# Patient Record
Sex: FEMALE | Race: WHITE | URBAN - METROPOLITAN AREA
[De-identification: names, ages, dates, MRNs, and addresses within clinical notes are randomized per-mention and may not be internally consistent; named-entity substitution may affect disease eponyms.]

---

## 2017-06-27 ENCOUNTER — OFFICE VISIT (OUTPATIENT)
Dept: ENDOCRINOLOGY | Age: 82
End: 2017-06-27

## 2017-06-27 ENCOUNTER — HOSPITAL ENCOUNTER (OUTPATIENT)
Dept: LAB | Age: 82
Discharge: HOME | End: 2017-06-27
Payer: MEDICARE

## 2017-06-27 VITALS
TEMPERATURE: 98.1 F | BODY MASS INDEX: 26.61 KG/M2 | OXYGEN SATURATION: 98 % | HEART RATE: 83 BPM | HEIGHT: 62 IN | WEIGHT: 144.6 LBS | SYSTOLIC BLOOD PRESSURE: 160 MMHG | RESPIRATION RATE: 16 BRPM | DIASTOLIC BLOOD PRESSURE: 78 MMHG

## 2017-06-27 DIAGNOSIS — E11.65 TYPE 2 DIABETES MELLITUS WITH HYPERGLYCEMIA, WITHOUT LONG-TERM CURRENT USE OF INSULIN (HCC): ICD-10-CM

## 2017-06-27 DIAGNOSIS — E04.9 NODULAR GOITER: Primary | ICD-10-CM

## 2017-06-27 PROCEDURE — 84443 ASSAY THYROID STIM HORMONE: CPT

## 2017-06-27 PROCEDURE — 84439 ASSAY OF FREE THYROXINE: CPT

## 2017-06-27 PROCEDURE — 36415 COLL VENOUS BLD VENIPUNCTURE: CPT

## 2017-06-27 RX ORDER — GLIMEPIRIDE 1 MG/1
1 TABLET ORAL
COMMUNITY
Start: 2017-05-12

## 2017-06-27 RX ORDER — ESCITALOPRAM OXALATE 5 MG/1
5 TABLET ORAL DAILY
Refills: 0 | COMMUNITY
Start: 2017-06-13

## 2017-06-27 RX ORDER — DIPHENHYDRAMINE HCL 25 MG
25 CAPSULE ORAL
COMMUNITY

## 2017-06-27 RX ORDER — LISINOPRIL 20 MG/1
20 TABLET ORAL DAILY
COMMUNITY
Start: 2017-05-12

## 2017-06-27 RX ORDER — BISMUTH SUBSALICYLATE 262 MG
1 TABLET,CHEWABLE ORAL DAILY
COMMUNITY

## 2017-06-27 RX ORDER — FENOFIBRATE 145 MG/1
145 TABLET ORAL DAILY
COMMUNITY
Start: 2017-06-14

## 2017-06-27 RX ORDER — DILTIAZEM HYDROCHLORIDE 180 MG/1
180 CAPSULE, EXTENDED RELEASE ORAL DAILY
Refills: 0 | COMMUNITY
Start: 2017-06-12

## 2017-06-27 RX ORDER — FUROSEMIDE 20 MG/1
20 TABLET ORAL DAILY
COMMUNITY
Start: 2017-05-30

## 2017-06-27 RX ORDER — APIXABAN 2.5 MG/1
TABLET, FILM COATED ORAL
Refills: 0 | COMMUNITY
Start: 2017-06-12

## 2017-06-27 RX ORDER — CLONIDINE HYDROCHLORIDE 0.1 MG/1
0.1 TABLET ORAL DAILY
COMMUNITY
Start: 2017-05-23

## 2017-06-27 RX ORDER — MELATONIN
1000 DAILY
COMMUNITY

## 2017-06-27 RX ORDER — GLUCOSAM/CHONDRO/HERB 149/HYAL 750-100 MG
1000 TABLET ORAL 2 TIMES DAILY
COMMUNITY

## 2017-06-27 RX ORDER — FLUTICASONE PROPIONATE 50 MCG
SPRAY, SUSPENSION (ML) NASAL
Refills: 0 | COMMUNITY
Start: 2017-03-22

## 2017-06-27 NOTE — MR AVS SNAPSHOT
Visit Information Date & Time Provider Department Dept. Phone Encounter #  
 6/27/2017  1:15 PM Long Oliva MD [facility] [phone] [id number] Upcoming Health Maintenance Date Due DTaP/Tdap/Td series (1 - Tdap) 11/30/1955 ZOSTER VACCINE AGE 60> 11/30/1994 GLAUCOMA SCREENING Q2Y 11/30/1999 OSTEOPOROSIS SCREENING (DEXA) 11/30/1999 Pneumococcal 65+ Low/Medium Risk (1 of 2 - PCV13) 11/30/1999 MEDICARE YEARLY EXAM 11/30/1999 INFLUENZA AGE 9 TO ADULT 8/1/2017 Allergies as of 6/27/2017  Never Reviewed Severity Noted Reaction Type Reactions Penicillins  06/27/2017    Swelling Tramadol  06/27/2017    Nausea and Vomiting Current Immunizations  Never Reviewed No immunizations on file. Not reviewed this visit You Were Diagnosed With   
  
 Codes Comments Nodular goiter    -  Primary ICD-10-CM: E04.9 ICD-9-CM: 541. 9 Vitals BP Pulse Temp Resp Height(growth percentile) Weight(growth percentile) 160/78 (BP 1 Location: Right arm, BP Patient Position: Sitting) 83 98.1 °F (36.7 °C) (Oral) 16 5' 2\" (1.575 m) 144 lb 9.6 oz (65.6 kg) SpO2 BMI Smoking Status 98% 26.45 kg/m2 Never Smoker BMI and BSA Data Body Mass Index Body Surface Area  
 26.45 kg/m 2 1.69 m 2 Your Updated Medication List  
  
Notice  As of 6/27/2017  2:03 PM  
 You have not been prescribed any medications. Patient Instructions Thyroid Nodules: Care Instructions Your Care Instructions Thyroid nodules are growths or lumps in the thyroid gland. Your thyroid is in the front of your neck. It controls how your body uses energy. You may have tests to see if the nodule is caused by cancer. Most nodules aren't cancer and don't cause problems. Many don't even need treatment. If you do have cancer, it can usually be cured.  Treatment will probably include surgery. You may also get radioactive iodine treatment. If your thyroid can't make thyroid hormone after treatment, you can take a pill every day to replace the hormone. Follow-up care is a key part of your treatment and safety. Be sure to make and go to all appointments, and call your doctor if you are having problems. It's also a good idea to know your test results and keep a list of the medicines you take. How can you care for yourself at home? · Be safe with medicines. If you take thyroid hormone medicine: ¨ Take it exactly as prescribed. Call your doctor if you think you are having a problem with your medicine. If you take the right amount and don't skip doses, you probably won't have side effects. ¨ Do not take it with calcium, vitamins, or iron. ¨ Try not to miss a dose. ¨ Do not take extra doses. This will not help you get better any faster. It may also cause side effects. ¨ Tell your doctor about any medicines you take. This includes over-the-counter medicines. ¨ Wear a medical alert bracelet or necklace that says you take thyroid hormones. You can buy these at most drugsHexagram [employer]. When should you call for help? Call 911 anytime you think you may need emergency care. For example, call if: 
· You lose consciousness. Call your doctor now or seek immediate medical care if: 
· You have shortness of breath. Watch closely for changes in your health, and be sure to contact your doctor if: 
· You have pain in your neck, jaw, or ear. · You have problems swallowing. · You feel weak and tired. · You have nervousness, a fast heartbeat, hand tremors, problems sleeping, increased sweating, and weight loss. · You do not feel better even though you are taking your medicine. Where can you learn more? Go to http://juliet-cris.info/. Enter A505 in the search box to learn more about \"Thyroid Nodules: Care Instructions. \" Current as of: January 31, 2017 Content Version: 11.3 © 1401-7174 SocialChorus. Care instructions adapted under license by FaceBuzz (which disclaims liability or warranty for this information). If you have questions about a medical condition or this instruction, always ask your healthcare professional. [address] any warranty or liability for your use of this information. Fine-Needle Thyroid Biopsy: What to Expect at [facility] Your Recovery During your biopsy, your doctor placed a thin needle through your skin and into your thyroid gland to take a sample of tissue. This may have been done to find what is causing a lump or growth in your thyroid. You may find it uncomfortable to lie still with your head tipped backward. The biopsy site may be sore and tender for 1 to 2 days. This care sheet gives you a general idea about how long it will take for you to recover. But each person recovers at a different pace. Follow the steps below to feel better as quickly as possible. How can you care for yourself at home? Activity · Rest when you feel tired. Getting enough sleep will help you recover. Diet · You can eat your normal diet. If your stomach is upset, try bland, low-fat foods like plain rice, broiled chicken, toast, and yogurt. Medicines · Your doctor will tell you if and when you can restart your medicines. He or she will also give you instructions about taking any new medicines. · If you take blood thinners, such as warfarin (Coumadin), clopidogrel (Plavix), or aspirin, be sure to talk to your doctor. He or she will tell you if and when to start taking those medicines again. Make sure that you understand exactly what your doctor wants you to do. · Take pain medicines exactly as directed. ¨ If the doctor gave you a prescription medicine for pain, take it as prescribed. ¨ If you are not taking a prescription pain medicine, ask your doctor if you can take an over-the-counter medicine. · If you think your pain medicine is making you sick to your stomach: 
¨ Take your medicine after meals (unless your doctor has told you not to). ¨ Ask your doctor for a different pain medicine. Incision care Keep the biopsy site covered and dry for 48 hours. A small amount of bleeding from the biopsy site can be expected. Ask your doctor how much drainage to expect. Follow-up care is a key part of your treatment and safety. Be sure to make and go to all appointments, and call your doctor if you are having problems. It's also a good idea to know your test results and keep a list of the medicines you take. When should you call for help? Call 911 anytime you think you may need emergency care. For example, call if: 
· You have severe trouble breathing. Call your doctor now or seek immediate medical care if: 
· You have a lot of bleeding through the bandage. · You have a hard time swallowing. · You have new or worsening pain. · You have symptoms of infection, such as: 
¨ Increased pain, swelling, warmth, or redness. ¨ Red streaks leading from the biopsy site. ¨ Pus draining from the biopsy site. ¨ A fever. Watch closely for any changes in your health, and be sure to contact your doctor if: 
· You're not getting better as expected. · You notice a change in your voice. Where can you learn more? Go to http://juliet-cris.info/. Enter H308 in the search box to learn more about \"Fine-Needle Thyroid Biopsy: What to Expect at Home. \" Current as of: July 28, 2016 Content Version: 11.3 © 9147-5108 Healthwise, Incorporated. Care instructions adapted under license by [employer]IEO (which disclaims liability or warranty for this information). If you have questions about a medical condition or this instruction, always ask your healthcare professional. [place] [address] any warranty or liability for your use of this information. Introducing [facility] SERVICES! Karlee Laughlin introduces Nyxoah patient portal. Now you can access parts of your medical record, email your doctor's office, and request medication refills online. 1. In your internet browser, go to [email or web address] 2. Click on the First Time User? Click Here link in the Sign In box. You will see the New Member Sign Up page. 3. Enter your Nyxoah Access Code exactly as it appears below. You will not need to use this code after youve completed the sign-up process. If you do not sign up before the expiration date, you must request a new code. · Nyxoah Access Code: [email or web address] Expires: 9/25/2017  2:03 PM 
 
4. Enter the last four digits of your Social Security Number (xxxx) and Date of Birth (mm/dd/yyyy) as indicated and click Submit. You will be taken to the next sign-up page. 5. Create a Nyxoah ID. This will be your Nyxoah login ID and cannot be changed, so think of one that is secure and easy to remember. 6. Create a Nyxoah password. You can change your password at any time. 7. Enter your Password Reset Question and Answer. This can be used at a later time if you forget your password. 8. Enter your e-mail address. You will receive e-mail notification when new information is available in [address] 9. Click Sign Up. You can now view and download portions of your medical record. 10. Click the Download Summary menu link to download a portable copy of your medical information. If you have questions, please visit the Frequently Asked Questions section of the Nyxoah website. Remember, Nyxoah is NOT to be used for urgent needs. For medical emergencies, dial 911. Now available from your iPhone and Android! Please provide this summary of care documentation to your next provider. Your primary care clinician is listed as [facility]. If you have any questions after today's visit, please call [phone].

## 2017-06-27 NOTE — PATIENT INSTRUCTIONS
Thyroid Nodules: Care Instructions  Your Care Instructions  Thyroid nodules are growths or lumps in the thyroid gland. Your thyroid is in the front of your neck. It controls how your body uses energy. You may have tests to see if the nodule is caused by cancer. Most nodules aren't cancer and don't cause problems. Many don't even need treatment. If you do have cancer, it can usually be cured. Treatment will probably include surgery. You may also get radioactive iodine treatment. If your thyroid can't make thyroid hormone after treatment, you can take a pill every day to replace the hormone. Follow-up care is a key part of your treatment and safety. Be sure to make and go to all appointments, and call your doctor if you are having problems. It's also a good idea to know your test results and keep a list of the medicines you take. How can you care for yourself at home? · Be safe with medicines. If you take thyroid hormone medicine:  ¨ Take it exactly as prescribed. Call your doctor if you think you are having a problem with your medicine. If you take the right amount and don't skip doses, you probably won't have side effects. ¨ Do not take it with calcium, vitamins, or iron. ¨ Try not to miss a dose. ¨ Do not take extra doses. This will not help you get better any faster. It may also cause side effects. ¨ Tell your doctor about any medicines you take. This includes over-the-counter medicines. ¨ Wear a medical alert bracelet or necklace that says you take thyroid hormones. You can buy these at most drugstores. When should you call for help? Call 911 anytime you think you may need emergency care. For example, call if:  · You lose consciousness. Call your doctor now or seek immediate medical care if:  · You have shortness of breath. Watch closely for changes in your health, and be sure to contact your doctor if:  · You have pain in your neck, jaw, or ear. · You have problems swallowing.   · You feel weak and tired. · You have nervousness, a fast heartbeat, hand tremors, problems sleeping, increased sweating, and weight loss. · You do not feel better even though you are taking your medicine. Where can you learn more? Go to http://juliet-cris.info/. Enter L162 in the search box to learn more about \"Thyroid Nodules: Care Instructions. \"  Current as of: January 31, 2017  Content Version: 11.3  © 0089-2281 Hubskip. Care instructions adapted under license by Tempronics (which disclaims liability or warranty for this information). If you have questions about a medical condition or this instruction, always ask your healthcare professional. [place] [address] any warranty or liability for your use of this information. Fine-Needle Thyroid Biopsy: What to Expect at [address] [place] [address]    During your biopsy, your doctor placed a thin needle through your skin and into your thyroid gland to take a sample of tissue. This may have been done to find what is causing a lump or growth in your thyroid. You may find it uncomfortable to lie still with your head tipped backward. The biopsy site may be sore and tender for 1 to 2 days. This care sheet gives you a general idea about how long it will take for you to recover. But each person recovers at a different pace. Follow the steps below to feel better as quickly as possible. How can you care for yourself at home? Activity  · Rest when you feel tired. Getting enough sleep will help you recover. Diet  · You can eat your normal diet. If your stomach is upset, try bland, low-fat foods like plain rice, broiled chicken, toast, and yogurt. Medicines  · Your doctor will tell you if and when you can restart your medicines. He or she will also give you instructions about taking any new medicines.   · If you take blood thinners, such as warfarin (Coumadin), clopidogrel (Plavix), or aspirin, be sure to talk to your doctor. He or she will tell you if and when to start taking those medicines again. Make sure that you understand exactly what your doctor wants you to do. · Take pain medicines exactly as directed. ¨ If the doctor gave you a prescription medicine for pain, take it as prescribed. ¨ If you are not taking a prescription pain medicine, ask your doctor if you can take an over-the-counter medicine. · If you think your pain medicine is making you sick to your stomach:  ¨ Take your medicine after meals (unless your doctor has told you not to). ¨ Ask your doctor for a different pain medicine. Incision care  Keep the biopsy site covered and dry for 48 hours. A small amount of bleeding from the biopsy site can be expected. Ask your doctor how much drainage to expect. Follow-up care is a key part of your treatment and safety. Be sure to make and go to all appointments, and call your doctor if you are having problems. It's also a good idea to know your test results and keep a list of the medicines you take. When should you call for help? Call 911 anytime you think you may need emergency care. For example, call if:  · You have severe trouble breathing. Call your doctor now or seek immediate medical care if:  · You have a lot of bleeding through the bandage. · You have a hard time swallowing. · You have new or worsening pain. · You have symptoms of infection, such as:  ¨ Increased pain, swelling, warmth, or redness. ¨ Red streaks leading from the biopsy site. ¨ Pus draining from the biopsy site. ¨ A fever. Watch closely for any changes in your health, and be sure to contact your doctor if:  · You're not getting better as expected. · You notice a change in your voice. Where can you learn more? Go to http://juliet-cirs.info/. Enter M566 in the search box to learn more about \"Fine-Needle Thyroid Biopsy: What to Expect at Home. \"  Current as of: July 28, 2016  Content Version: 11.3  © 7522-9490 HealthHamilton, Incorporated. Care instructions adapted under license by Flourish [employer] (which disclaims liability or warranty for this information). If you have questions about a medical condition or this instruction, always ask your healthcare professional. [address] any warranty or liability for your use of this information.

## 2017-06-27 NOTE — PROGRESS NOTES
[facility]               Supriya Connor MD        [address] 78 [phone] Fax [phone]          Patient Information  Date:6/27/2017  Name : Kell Sheehan 80 y.o.     YOB: 1934         Referred by:  Dr Gigi Espinosa        Chief Complaint   Patient presents with   [facility] New Patient     referred by Dr. Hebert Toledo for Thyroid       History of present illness    Kell Sheehan is a 80 y.o. female  here for evaluation of thyroid nodule. He was referred by Dr. Gigi Espinosa for. Evaluation and management of thyroid nodules found on PET scan. She has history of metastatic melanoma, status post radiation therapy   No prior history of thyroid disorders,  Father had goiter    No dysphagia,dysphonia or dyspnea. Denies nervousness,shakiness,palpitations  No constipation or cold intolerance/heat intolerance   FH of thyroid disease. No FH of thyroid cancer     She has type 2 diabetes mellitus Which is controlled with oral medications,no hypoglycemia    Past Medical History:   Diagnosis Date    Atrial fibrillation (HCC)     [facility]     Heart disease     Hyperlipidemia     Hypertension     Incontinence     [facility] ([facility]     [facility]        Current Outpatient Prescriptions   Medication Sig    ELIQUIS 2.5 mg tablet take 1 tablet by mouth twice a day    escitalopram oxalate (LEXAPRO) 5 mg tablet Take 5 mg by mouth daily.  DILT- mg XR capsule Take 120 mg by mouth daily.  cloNIDine HCl (CATAPRES) 0.1 mg tablet Take 0.1 mg by mouth two (2) times a day.  TRICOR 145 mg tablet Take 145 mg by mouth daily.  fluticasone (FLONASE) 50 mcg/actuation nasal spray instill 1 spray INTO EACH NOSTRIL ONCE DAILY PRN    furosemide (LASIX) 20 mg tablet Take 20 mg by mouth daily.  lisinopril (PRINIVIL, ZESTRIL) 20 mg tablet Take 20 mg by mouth daily.  glimepiride (AMARYL) 1 mg tablet Take 1 mg by mouth every morning.     diphenhydrAMINE (BENADRYL) 25 mg capsule Take 25 mg by mouth every six (6) hours as needed.  Omega-3-DHA-EPA-Fish Oil 1,000 mg (120 mg-180 mg) cap Take 1,000 mg by mouth two (2) times a day.  multivitamin (ONE A DAY) tablet Take 1 Tab by mouth daily.  cholecalciferol (VITAMIN D3) 1,000 unit tablet Take 1,000 Units by mouth daily. No current facility-administered medications for this visit. Review of Systems:  - Constitutional Symptoms: no fevers, chills, weight loss  - Eyes: no blurry vision or double vision  - Cardiovascular: no chest pain or palpitations  - Respiratory: no cough or shortness of breath  - Gastrointestinal: no  abdominal pain  - Musculoskeletal: + joint pains + weakness  - Integumentary: no rashes  - Neurological: no numbness, tingling, or headaches  - Psychiatric: no depression or anxiety  - Endocrine: no heat or cold intolerance, no polyuria or polydipsia    Physical Examination:  Blood pressure 160/78, pulse 83, temperature 98.1 °F (36.7 °C), temperature source Oral, resp. rate 16, height 5' 2\" (1.575 m), weight 144 lb 9.6 oz (65.6 kg), SpO2 98 %. Body mass index is 26.45 kg/(m^2). - General: pleasant, no distress, good eye contact  - HEENT: no exopthalmos, no periorbital edema, no scleral/conjunctival injection, EOMI, no lid lag or stare  - Neck: supple, no thyromegaly, nodules,   - Cardiovascular: regular,  normal S1 and S2, no murmurs  - Respiratory: clear to auscultation bilaterally  - Gastrointestinal: soft, nontender, nondistended, BS +  - Musculoskeletal: no proximal muscle weakness in upper or lower extremities  - Integumentary: no tremors, no edema  - Neurological: alert and oriented   - Psychiatric: normal mood and affect  - Skin - normal turgor    Data Reviewed:         [] Reviewed labs    Assessment/Plan:     Multinodular goiter/    She is euthyroid clinically and has no compressive symptoms.   We discussed the natural history of thyroid nodules,most of the nodules are benign and a small percentage ( 5%) can be malignant . We will obtain TSH and proceed with ultrasound. Type 2 diabetes mellitus    Metastatic melanoma - in remission      Hypertension: Continue with present medications        Follow-up Disposition: Not on File    Thank you for allowing me to participate in the care of this patient.     Aby Shoemaker MD

## 2017-06-27 NOTE — PROGRESS NOTES
Johnie Poole is a 80 y.o. female here for   Chief Complaint   Patient presents with    New Patient     referred by Dr. Ok Hurtado for Thyroid       1. Have you been to the ER, urgent care clinic since your last visit? Hospitalized since your last visit? - [facility] mid May for A FIB    2. Have you seen or consulted any other health care providers outside of the [address] [place] [address] since your last visit?   Include any pap smears or colon screening.- Dr. Abiodun Kebede PCP    Wt Readings from Last 3 Encounters:   No data found for Wt     Temp Readings from Last 3 Encounters:   No data found for Temp     BP Readings from Last 3 Encounters:   No data found for BP     Pulse Readings from Last 3 Encounters:   No data found for Pulse

## 2017-06-28 LAB
T4 FREE SERPL-MCNC: 1.27 NG/DL (ref 0.82–1.77)
TSH SERPL DL<=0.005 MIU/L-ACNC: 3.17 UIU/ML (ref 0.45–4.5)

## 2017-08-01 ENCOUNTER — OFFICE VISIT (OUTPATIENT)
Dept: ENDOCRINOLOGY | Age: 82
End: 2017-08-01

## 2017-08-01 VITALS
HEIGHT: 62 IN | DIASTOLIC BLOOD PRESSURE: 85 MMHG | OXYGEN SATURATION: 96 % | SYSTOLIC BLOOD PRESSURE: 178 MMHG | HEART RATE: 90 BPM | TEMPERATURE: 98.1 F | BODY MASS INDEX: 27.03 KG/M2 | WEIGHT: 146.9 LBS | RESPIRATION RATE: 17 BRPM

## 2017-08-01 DIAGNOSIS — E11.65 TYPE 2 DIABETES MELLITUS WITH HYPERGLYCEMIA, WITHOUT LONG-TERM CURRENT USE OF INSULIN (HCC): ICD-10-CM

## 2017-08-01 DIAGNOSIS — E04.9 NODULAR GOITER: Primary | ICD-10-CM

## 2017-08-01 NOTE — PATIENT INSTRUCTIONS
Thyroid Nodules: Care Instructions  Your Care Instructions  Thyroid nodules are growths or lumps in the thyroid gland. Your thyroid is in the front of your neck. It controls how your body uses energy. You may have tests to see if the nodule is caused by cancer. Most nodules aren't cancer and don't cause problems. Many don't even need treatment. If you do have cancer, it can usually be cured. Treatment will probably include surgery. You may also get radioactive iodine treatment. If your thyroid can't make thyroid hormone after treatment, you can take a pill every day to replace the hormone. Follow-up care is a key part of your treatment and safety. Be sure to make and go to all appointments, and call your doctor if you are having problems. It's also a good idea to know your test results and keep a list of the medicines you take. How can you care for yourself at home? · Be safe with medicines. If you take thyroid hormone medicine:  ¨ Take it exactly as prescribed. Call your doctor if you think you are having a problem with your medicine. If you take the right amount and don't skip doses, you probably won't have side effects. ¨ Do not take it with calcium, vitamins, or iron. ¨ Try not to miss a dose. ¨ Do not take extra doses. This will not help you get better any faster. It may also cause side effects. ¨ Tell your doctor about any medicines you take. This includes over-the-counter medicines. ¨ Wear a medical alert bracelet or necklace that says you take thyroid hormones. You can buy these at most drugstores. When should you call for help? Call 911 anytime you think you may need emergency care. For example, call if:  · You lose consciousness. Call your doctor now or seek immediate medical care if:  · You have shortness of breath. Watch closely for changes in your health, and be sure to contact your doctor if:  · You have pain in your neck, jaw, or ear. · You have problems swallowing.   · You feel weak and tired. · You have nervousness, a fast heartbeat, hand tremors, problems sleeping, increased sweating, and weight loss. · You do not feel better even though you are taking your medicine. Where can you learn more? Go to http://juliet-cris.info/. Enter T701 in the search box to learn more about \"Thyroid Nodules: Care Instructions. \"  Current as of: January 31, 2017  Content Version: 11.3  © 2928-7142 [employer]. Care instructions adapted under license by [employer] (which disclaims liability or warranty for this information). If you have questions about a medical condition or this instruction, always ask your healthcare professional. [place] [address] any warranty or liability for your use of this information. Fine-Needle Thyroid Biopsy: Before Your Procedure  What is a needle thyroid biopsy? During a thyroid biopsy, your doctor uses a thin needle to remove a small sample of tissue from your thyroid gland. You may be having the biopsy to find what is causing a lump or growth in your thyroid. The biopsy causes very little pain. But your doctor may need to put the needle into your thyroid more than once. This is done to be sure enough fluid and tissue is taken for the test.  The doctor then looks at the tissue sample under a microscope for cancer, infection, or other thyroid problems. The biopsy is done in a hospital, a clinic, or your doctor's office. During the test, you will lie on your back with a pillow under your shoulders. Your head will be tipped backward and your neck extended. This position pushes the thyroid gland forward. This makes it easier to do the biopsy. You may be given medicine to help you relax. Your doctor may use an ultrasound to guide the placement of the needle. It is important to lie very still during the biopsy. Do not cough, talk, or swallow when the needle is in place.   In some cases, thyroid surgery may be needed if a needle biopsy doesn't give a clear result. This would be done at a different time. In this surgery, the doctor takes a tissue sample through a cut (incision) in the skin. Follow-up care is a key part of your treatment and safety. Be sure to make and go to all appointments, and call your doctor if you are having problems. It's also a good idea to know your test results and keep a list of the medicines you take. What happens before the procedure? Procedures can be stressful. This information will help you understand what you can expect. And it will help you safely prepare for your procedure. You do not need to do anything before your biopsy. You will be awake during the biopsy. Preparing for the procedure  · Understand exactly what procedure is planned, along with the risks, benefits, and other options. · Tell your doctors ALL the medicines, vitamins, supplements, and herbal remedies you take. Some of these can increase the risk of bleeding or interact with anesthesia. · If you take blood thinners, such as warfarin (Coumadin), clopidogrel (Plavix), or aspirin, be sure to talk to your doctor. He or she will tell you if you should stop taking these medicines before your procedure. Make sure that you understand exactly what your doctor wants you to do. · Your doctor will tell you which medicines to take or stop before your procedure. You may need to stop taking certain medicines a week or more before the procedure. So talk to your doctor as soon as you can. · If you have an advance directive, let your doctor know. It may include a living will and a durable power of  for health care. Bring a copy to the hospital. If you don't have one, you may want to prepare one. It lets your doctor and loved ones know your health care wishes. Doctors advise that everyone prepare these papers before any type of surgery or procedure. What happens on the day of the procedure?   · Follow the instructions exactly about when to stop eating and drinking. If you don't, your procedure may be canceled. If your doctor told you to take your medicines on the day of the procedure, take them with only a sip of water. · Take a bath or shower before you come in for your procedure. Do not apply lotions, perfumes, deodorants, or nail polish. · Take off all jewelry and piercings. And take out contact lenses, if you wear them. At the hospital or surgery center  · Bring a picture ID. · The procedure will take about 5 to 10 minutes. Going home  · You may need to stay in the hospital overnight. · Be sure you have someone to drive you home. Anesthesia and pain medicine make it unsafe for you to drive. · You will be given more specific instructions about recovering from your procedure. They will cover things like diet, wound care, follow-up care, driving, and getting back to your normal routine. When should you call your doctor? · You have questions or concerns. · You don't understand how to prepare for your procedure. · You become ill before the procedure (such as fever, flu, or a cold). · You need to reschedule or have changed your mind about having the procedure. Where can you learn more? Go to http://juliet-cris.info/. Enter J510 in the search box to learn more about \"Fine-Needle Thyroid Biopsy: Before Your Procedure. \"  Current as of: July 28, 2016  Content Version: 11.3  © 2472-0423 [employer], [facility]. Care instructions adapted under license by [employer] (which disclaims liability or warranty for this information). If you have questions about a medical condition or this instruction, always ask your healthcare professional. [place] [address] any warranty or liability for your use of this information.

## 2017-08-01 NOTE — PROGRESS NOTES
[facility]               Stew Todd MD        [address] 78 444 81 66 Fax [phone]          Patient Information  Date:8/1/2017  Name : Evelin Duran 80 y.o.     YOB: 1934         Referred by:  Dr Henrik Tena        Chief Complaint   Patient presents with    Thyroid Problem       History of present illness    Evelin Duran is a 80 y.o. female  here for fu  of thyroid nodule. He was referred by Dr. Henrik Tena for. Evaluation and management of thyroid nodules found on PET scan. She has history of metastatic melanoma, status post radiation therapy   No prior history of thyroid disorders,  Father had goiter    No dysphagia,dysphonia or dyspnea. Denies nervousness,shakiness,palpitations  No constipation or cold intolerance/heat intolerance   FH of thyroid disease. No FH of thyroid cancer     She has type 2 diabetes mellitus Which is controlled with oral medications,no hypoglycemia    Past Medical History:   Diagnosis Date    Atrial fibrillation (HCC)     [facility]     Heart disease     Hyperlipidemia     Hypertension     Incontinence     [facility]     Type 2 diabetes mellitus ([facility]        Current Outpatient Prescriptions   Medication Sig    ELIQUIS 2.5 mg tablet take 1 tablet by mouth twice a day    escitalopram oxalate (LEXAPRO) 5 mg tablet Take 5 mg by mouth daily.  DILT- mg XR capsule Take 120 mg by mouth daily.  cloNIDine HCl (CATAPRES) 0.1 mg tablet Take 0.1 mg by mouth two (2) times a day.  TRICOR 145 mg tablet Take 145 mg by mouth daily.  fluticasone (FLONASE) 50 mcg/actuation nasal spray instill 1 spray INTO EACH NOSTRIL ONCE DAILY PRN    furosemide (LASIX) 20 mg tablet Take 20 mg by mouth daily.  lisinopril (PRINIVIL, ZESTRIL) 20 mg tablet Take 20 mg by mouth daily.  glimepiride (AMARYL) 1 mg tablet Take 1 mg by mouth every morning.     diphenhydrAMINE (BENADRYL) 25 mg capsule Take 25 mg by mouth every six (6) hours as needed.  Omega-3-DHA-EPA-Fish Oil 1,000 mg (120 mg-180 mg) cap Take 1,000 mg by mouth two (2) times a day.  multivitamin (ONE A DAY) tablet Take 1 Tab by mouth daily.  cholecalciferol (VITAMIN D3) 1,000 unit tablet Take 1,000 Units by mouth daily. No current facility-administered medications for this visit. Review of Systems:  -   - Respiratory: no cough or shortness of breath  - Gastrointestinal: no  abdominal pain  - Musculoskeletal: + joint pains + weakness  - Integumentary: no rashes  - Neurological: no numbness, tingling, or headaches  - Psychiatric: no depression or anxiety  - Endocrine: no heat or cold intolerance, no polyuria or polydipsia    Physical Examination:  Blood pressure 178/85, pulse 90, temperature 98.1 °F (36.7 °C), temperature source Oral, resp. rate 17, height 5' 2\" (1.575 m), weight 146 lb 14.4 oz (66.6 kg), SpO2 96 %. Body mass index is 26.87 kg/(m^2). - General: pleasant, no distress, good eye contact  - HEENT: no exopthalmos, no periorbital edema, no scleral/conjunctival injection, EOMI, no lid lag or stare  - Neck: supple,    - Cardiovascular: regular,  normal S1 and S2, no murmurs  - Respiratory: clear to auscultation bilaterally  - Gastrointestinal: soft, nontender, nondistended, BS +  - Musculoskeletal: no tremors, no edema  - Neurological: alert and oriented   - Psychiatric: normal mood and affect  - Skin - normal turgor    Data Reviewed:         [] Reviewed labs    Assessment/Plan:     Multinodular goiter/    She is euthyroid clinically and has no compressive symptoms.   Right thyroid nodule with microcalcifications - given hx of XRT discussed about FNA which she wants to discuss with her family and get back to me     Type 2 diabetes mellitus    Metastatic melanoma - in remission      Hypertension: Continue with present medications        Follow-up Disposition: Not on File    Thank you for allowing me to participate in the care of this patient.     Debra Reeder MD

## 2017-08-01 NOTE — MR AVS SNAPSHOT
Visit Information Date & Time Provider Department Dept. Phone Encounter #  
 8/1/2017 11:30 AM Denver Shi, MD [facility] [phone] [id number] Upcoming Health Maintenance Date Due HEMOGLOBIN A1C Q6M 1934 LIPID PANEL Q1 1934 FOOT EXAM Q1 11/30/1944 MICROALBUMIN Q1 11/30/1944 EYE EXAM RETINAL OR DILATED Q1 11/30/1944 DTaP/Tdap/Td series (1 - Tdap) 11/30/1955 ZOSTER VACCINE AGE 60> 9/30/1994 GLAUCOMA SCREENING Q2Y 11/30/1999 OSTEOPOROSIS SCREENING (DEXA) 11/30/1999 Pneumococcal 65+ Low/Medium Risk (1 of 2 - PCV13) 11/30/1999 MEDICARE YEARLY EXAM 11/30/1999 INFLUENZA AGE 9 TO ADULT 8/1/2017 Allergies as of 8/1/2017  Review Complete On: 8/1/2017 By: [place] Severity Noted Reaction Type Reactions Penicillins  06/27/2017    Swelling Tramadol  06/27/2017    Nausea and Vomiting Current Immunizations  Never Reviewed No immunizations on file. Not reviewed this visit You Were Diagnosed With   
  
 Codes Comments Nodular goiter    -  Primary ICD-10-CM: E04.9 ICD-9-CM: 173. 9 Vitals BP Pulse Temp Resp Height(growth percentile) Weight(growth percentile) 178/85 (BP 1 Location: Right arm, BP Patient Position: Sitting) 90 98.1 °F (36.7 °C) (Oral) 17 5' 2\" (1.575 m) 146 lb 14.4 oz (66.6 kg) SpO2 BMI OB Status Smoking Status 96% 26.87 kg/m2 Hysterectomy Never Smoker BMI and BSA Data Body Mass Index Body Surface Area  
 26.87 kg/m 2 1.71 m 2 Preferred Pharmacy Pharmacy Name Phone [phone] [place] [phone] Your Updated Medication List  
  
   
This list is accurate as of: 8/1/17 12:19 PM.  Always use your most recent med list.  
  
  
  
  
 BENADRYL 25 mg capsule Generic drug:  diphenhydrAMINE Take 25 mg by mouth every six (6) hours as needed. cloNIDine HCl 0.1 mg tablet Commonly known as:  CATAPRES Take 0.1 mg by mouth two (2) times a day. DILT- mg XR capsule Generic drug:  dilTIAZem XR Take 120 mg by mouth daily. ELIQUIS 2.5 mg tablet Generic drug:  apixaban  
take 1 tablet by mouth twice a day  
  
 escitalopram oxalate 5 mg tablet Commonly known as:  Rashmi Shukri Take 5 mg by mouth daily. fluticasone 50 mcg/actuation nasal spray Commonly known as:  FLONASE  
instill 1 spray INTO EACH NOSTRIL ONCE DAILY PRN  
  
 furosemide 20 mg tablet Commonly known as:  LASIX Take 20 mg by mouth daily. glimepiride 1 mg tablet Commonly known as:  AMARYL Take 1 mg by mouth every morning. lisinopril 20 mg tablet Commonly known as:  Dominique Mary Take 20 mg by mouth daily. multivitamin tablet Commonly known as:  ONE A DAY Take 1 Tab by mouth daily. Omega-3-DHA-EPA-Fish Oil 1,000 mg (120 mg-180 mg) Cap Take 1,000 mg by mouth two (2) times a day. TRICOR 145 mg tablet Generic drug:  fenofibrate nanocrystallized Take 145 mg by mouth daily. VITAMIN D3 1,000 unit tablet Generic drug:  cholecalciferol Take 1,000 Units by mouth daily. Patient Instructions Thyroid Nodules: Care Instructions Your Care Instructions Thyroid nodules are growths or lumps in the thyroid gland. Your thyroid is in the front of your neck. It controls how your body uses energy. You may have tests to see if the nodule is caused by cancer. Most nodules aren't cancer and don't cause problems. Many don't even need treatment. If you do have cancer, it can usually be cured. Treatment will probably include surgery. You may also get radioactive iodine treatment. If your thyroid can't make thyroid hormone after treatment, you can take a pill every day to replace the hormone. Follow-up care is a key part of your treatment and safety.  Be sure to make and go to all appointments, and call your doctor if you are having problems. It's also a good idea to know your test results and keep a list of the medicines you take. How can you care for yourself at home? · Be safe with medicines. If you take thyroid hormone medicine: ¨ Take it exactly as prescribed. Call your doctor if you think you are having a problem with your medicine. If you take the right amount and don't skip doses, you probably won't have side effects. ¨ Do not take it with calcium, vitamins, or iron. ¨ Try not to miss a dose. ¨ Do not take extra doses. This will not help you get better any faster. It may also cause side effects. ¨ Tell your doctor about any medicines you take. This includes over-the-counter medicines. ¨ Wear a medical alert bracelet or necklace that says you take thyroid hormones. You can buy these at most drugsDancingAnchovyes. When should you call for help? Call 911 anytime you think you may need emergency care. For example, call if: 
· You lose consciousness. Call your doctor now or seek immediate medical care if: 
· You have shortness of breath. Watch closely for changes in your health, and be sure to contact your doctor if: 
· You have pain in your neck, jaw, or ear. · You have problems swallowing. · You feel weak and tired. · You have nervousness, a fast heartbeat, hand tremors, problems sleeping, increased sweating, and weight loss. · You do not feel better even though you are taking your medicine. Where can you learn more? Go to http://juliet-cris.info/. Enter C185 in the search box to learn more about \"Thyroid Nodules: Care Instructions. \" Current as of: January 31, 2017 Content Version: 11.3 © 0714-7230 [email or web address]. Care instructions adapted under license by [employer] (which disclaims liability or warranty for this information).  If you have questions about a medical condition or this instruction, always ask your healthcare professional. [employer] disclaims any warranty or liability for your use of this information. Fine-Needle Thyroid Biopsy: Before Your Procedure What is a needle thyroid biopsy? During a thyroid biopsy, your doctor uses a thin needle to remove a small sample of tissue from your thyroid gland. You may be having the biopsy to find what is causing a lump or growth in your thyroid. The biopsy causes very little pain. But your doctor may need to put the needle into your thyroid more than once. This is done to be sure enough fluid and tissue is taken for the test. 
The doctor then looks at the tissue sample under a microscope for cancer, infection, or other thyroid problems. The biopsy is done in a hospital, a clinic, or your doctor's office. During the test, you will lie on your back with a pillow under your shoulders. Your head will be tipped backward and your neck extended. This position pushes the thyroid gland forward. This makes it easier to do the biopsy. You may be given medicine to help you relax. Your doctor may use an ultrasound to guide the placement of the needle. It is important to lie very still during the biopsy. Do not cough, talk, or swallow when the needle is in place. In some cases, thyroid surgery may be needed if a needle biopsy doesn't give a clear result. This would be done at a different time. In this surgery, the doctor takes a tissue sample through a cut (incision) in the skin. Follow-up care is a key part of your treatment and safety. Be sure to make and go to all appointments, and call your doctor if you are having problems. It's also a good idea to know your test results and keep a list of the medicines you take. What happens before the procedure? Procedures can be stressful. This information will help you understand what you can expect. And it will help you safely prepare for your procedure. You do not need to do anything before your biopsy. You will be awake during the biopsy. Preparing for the procedure · Understand exactly what procedure is planned, along with the risks, benefits, and other options. · Tell your doctors ALL the medicines, vitamins, supplements, and herbal remedies you take. Some of these can increase the risk of bleeding or interact with anesthesia. · If you take blood thinners, such as warfarin (Coumadin), clopidogrel (Plavix), or aspirin, be sure to talk to your doctor. He or she will tell you if you should stop taking these medicines before your procedure. Make sure that you understand exactly what your doctor wants you to do. · Your doctor will tell you which medicines to take or stop before your procedure. You may need to stop taking certain medicines a week or more before the procedure. So talk to your doctor as soon as you can. · If you have an advance directive, let your doctor know. It may include a living will and a durable power of  for health care. Bring a copy to the hospital. If you don't have one, you may want to prepare one. It lets your doctor and loved ones know your health care wishes. Doctors advise that everyone prepare these papers before any type of surgery or procedure. What happens on the day of the procedure? · Follow the instructions exactly about when to stop eating and drinking. If you don't, your procedure may be canceled. If your doctor told you to take your medicines on the day of the procedure, take them with only a sip of water. · Take a bath or shower before you come in for your procedure. Do not apply lotions, perfumes, deodorants, or nail polish. · Take off all jewelry and piercings. And take out contact lenses, if you wear them. At the hospital or surgery center · Bring a picture ID. · The procedure will take about 5 to 10 minutes. Going home · You may need to stay in the hospital overnight. · Be sure you have someone to drive you home. Anesthesia and pain medicine make it unsafe for you to drive. · You will be given more specific instructions about recovering from your procedure. They will cover things like diet, wound care, follow-up care, driving, and getting back to your normal routine. When should you call your doctor? · You have questions or concerns. · You don't understand how to prepare for your procedure. · You become ill before the procedure (such as fever, flu, or a cold). · You need to reschedule or have changed your mind about having the procedure. Where can you learn more? Go to http://juliet-cris.info/. Enter J510 in the search box to learn more about \"Fine-Needle Thyroid Biopsy: Before Your Procedure. \" Current as of: July 28, 2016 Content Version: 11.3 © 8129-5198 [employer]. Care instructions adapted under license by [employer] (which disclaims liability or warranty for this information). If you have questions about a medical condition or this instruction, always ask your healthcare professional. [place] [address] any warranty or liability for your use of this information. Introducing [facility]! [employer] introduces Sellaround patient portal. Now you can access parts of your medical record, email your doctor's office, and request medication refills online. 1. In your internet browser, go to [email or web address] 2. Click on the First Time User? Click Here link in the Sign In box. You will see the New Member Sign Up page. 3. Enter your Sellaround Access Code exactly as it appears below. You will not need to use this code after youve completed the sign-up process. If you do not sign up before the expiration date, you must request a new code. · Sellaround Access Code: [email or web address] Expires: 9/25/2017  2:03 PM 
 
4. Enter the last four digits of your Social Security Number (xxxx) and Date of Birth (mm/dd/yyyy) as indicated and click Submit.  You will be taken to the next sign-up page. 5. Create a Yorn ID. This will be your Yorn login ID and cannot be changed, so think of one that is secure and easy to remember. 6. Create a Yorn password. You can change your password at any time. 7. Enter your Password Reset Question and Answer. This can be used at a later time if you forget your password. 8. Enter your e-mail address. You will receive e-mail notification when new information is available in [address]. 9. Click Sign Up. You can now view and download portions of your medical record. 10. Click the Download Summary menu link to download a portable copy of your medical information. If you have questions, please visit the Frequently Asked Questions section of the Yorn website. Remember, Yorn is NOT to be used for urgent needs. For medical emergencies, dial 911. Now available from your iPhone and Android! Please provide this summary of care documentation to your next provider. Your primary care clinician is listed as [facility]. If you have any questions after today's visit, please call [phone].

## 2017-08-01 NOTE — PROGRESS NOTES
Chief Complaint   Patient presents with    Thyroid Problem     1. Have you been to the ER, urgent care clinic since your last visit? Hospitalized since your last visit? No    2. Have you seen or consulted any other health care providers outside of the [address] [place] [address] since your last visit? Include any pap smears or colon screening.  No

## 2017-08-02 ENCOUNTER — TELEPHONE (OUTPATIENT)
Dept: ENDOCRINOLOGY | Age: 82
End: 2017-08-02

## 2017-08-02 NOTE — PROGRESS NOTES
Thyroid Ultrasound Report    Patient Information  Date:8/1/2017  Name : Maurice Bird 80 y.o.     YOB: 1934         Referred by: DEVANTE Perera ,    Indication: Thyroid nodule/Thyroid cancer /Hashomoto's thyroiditis    Thyroid gland is diffusely heterogenous, right lobe measures 3.8 x 2.1 x 1.5 cm  There is a nodule in the right lobe measuring 2 x 1.36 x 1.26 with intranodular vascularity, possible microcalcification's. Isthmus measures 0.3 cm. Left lobe measures 2.7 x 0.9 x 1.6 cm. Impression:   Nodular goiter. Right thyroid nodule measuring 2 cm with internal vascularity. Fine needle aspiration biopsy recommended.                Gosia Grimaldo MD

## 2017-08-25 ENCOUNTER — TELEPHONE (OUTPATIENT)
Dept: ENDOCRINOLOGY | Age: 82
End: 2017-08-25

## 2017-08-30 NOTE — TELEPHONE ENCOUNTER
Pt states she has an appt in September for a thyroid biopsy. She wanted to let physician know she will be checking with physician who prescribed blood thinner for procedure instructions.

## 2017-09-19 ENCOUNTER — OFFICE VISIT (OUTPATIENT)
Dept: ENDOCRINOLOGY | Age: 82
End: 2017-09-19

## 2017-09-19 VITALS
TEMPERATURE: 97 F | WEIGHT: 145.3 LBS | DIASTOLIC BLOOD PRESSURE: 81 MMHG | HEART RATE: 83 BPM | SYSTOLIC BLOOD PRESSURE: 155 MMHG | HEIGHT: 62 IN | RESPIRATION RATE: 16 BRPM | OXYGEN SATURATION: 98 % | BODY MASS INDEX: 26.74 KG/M2

## 2017-09-19 DIAGNOSIS — E11.65 TYPE 2 DIABETES MELLITUS WITH HYPERGLYCEMIA, WITHOUT LONG-TERM CURRENT USE OF INSULIN (HCC): ICD-10-CM

## 2017-09-19 DIAGNOSIS — E04.9 NODULAR GOITER: Primary | ICD-10-CM

## 2017-09-19 NOTE — PROGRESS NOTES
Hue Choudhary is a 80 y.o. female here for   Chief Complaint   Patient presents with    Biopsy     Right Thyroid       1. Have you been to the ER, urgent care clinic since your last visit? Hospitalized since your last visit? -no    2. Have you seen or consulted any other health care providers outside of the [address] [place] [address] since your last visit?   Include any pap smears or colon screening.-no    Wt Readings from Last 3 Encounters:   08/01/17 146 lb 14.4 oz (66.6 kg)   06/27/17 144 lb 9.6 oz (65.6 kg)     Temp Readings from Last 3 Encounters:   08/01/17 98.1 °F (36.7 °C) (Oral)   06/27/17 98.1 °F (36.7 °C) (Oral)     BP Readings from Last 3 Encounters:   08/01/17 178/85   06/27/17 160/78     Pulse Readings from Last 3 Encounters:   08/01/17 90   06/27/17 83

## 2017-09-19 NOTE — PROGRESS NOTES
[facility] DIABETES & ENDOCRINOLOGY  OFFICE PROCEDURE PROGRESS NOTE        Chart reviewed for the following:   Karen Warner MD, have reviewed the History, Physical and updated the Allergic reactions for Dorothey Resendiz     Procedure performed by:  Ally Matthews MD    Assisted by:Flora Serrano LPN/Kendra Baer LPN    TIME OUT performed immediately prior to start of procedure:   Karen Warner MD, have performed the following reviews on Dorothey Resendiz prior to the start of the procedure:            * Patient was identified by name and date of birth   * Agreement on procedure being performed was verified  * Explained procedure and answered questions  * Procedure site verified and marked as necessary  * Patient was positioned for comfort  * Consent was signed and verified    Time 9:30 AM  Right     The risks, benefits and alternatives of ultrasound guided thyroid fine   needle aspiration were discussed with the patient. After all questions were   answered and informed written consent was obtained, patient was prepped  in a supine position. 1% lidocaine was used as local anesthetic. Using sonographic guidance, fine needle aspiration of thyroid  nodule was performed using 25-gauge needles. 4 aspirations were made using 25  G needles  Samples were submitted to cytology. Patient  tolerated procedure well without complications. Advised to keep ice for 30 minutes after going home. After care instructions provided.   Patient was told to expect a call in 2 weeks regarding the results       Pain at the site before procedure 0/10   Pain post procedure  0/10

## 2017-09-19 NOTE — PATIENT INSTRUCTIONS
Call your doctor right away if you have these   · Bleeding at the injection site for more than 10 minutes  · Redness, warmth, swelling, or drainage at the injection site  · Fever of 100.4°F (38.0°C), or higher        Call 911 if you have     Signs of severe allergic reaction (trouble breathing,tongue swelling )

## 2017-09-19 NOTE — MR AVS SNAPSHOT
Visit Information Date & Time Provider Department Dept. Phone Encounter #  
 9/19/2017  9:15 AM Mary Leon MD [facility] [phone] [id number] Follow-up Instructions Return in about 6 months (around 3/19/2018). Upcoming Health Maintenance Date Due HEMOGLOBIN A1C Q6M 1934 LIPID PANEL Q1 1934 FOOT EXAM Q1 11/30/1944 MICROALBUMIN Q1 11/30/1944 EYE EXAM RETINAL OR DILATED Q1 11/30/1944 DTaP/Tdap/Td series (1 - Tdap) 11/30/1955 ZOSTER VACCINE AGE 60> 9/30/1994 GLAUCOMA SCREENING Q2Y 11/30/1999 OSTEOPOROSIS SCREENING (DEXA) 11/30/1999 Pneumococcal 65+ Low/Medium Risk (1 of 2 - PCV13) 11/30/1999 MEDICARE YEARLY EXAM 11/30/1999 INFLUENZA AGE 9 TO ADULT 8/1/2017 Allergies as of 9/19/2017  Review Complete On: 9/19/2017 By: Mary Leon MD  
  
 Severity Noted Reaction Type Reactions Penicillins  06/27/2017    Swelling Tramadol  06/27/2017    Nausea and Vomiting Current Immunizations  Never Reviewed No immunizations on file. Not reviewed this visit You Were Diagnosed With   
  
 Codes Comments Nodular goiter    -  Primary ICD-10-CM: E04.9 ICD-9-CM: 795. 9 Type 2 diabetes mellitus with hyperglycemia, without long-term current use of insulin (HCC)     ICD-10-CM: E11.65 ICD-9-CM: 250.00, 790.29 Vitals BP Pulse Temp Resp Height(growth percentile) Weight(growth percentile) 155/81 (BP 1 Location: Left arm, BP Patient Position: Sitting) 83 97 °F (36.1 °C) (Oral) 16 5' 2\" (1.575 m) 145 lb 4.8 oz (65.9 kg) SpO2 BMI OB Status Smoking Status 98% 26.58 kg/m2 Hysterectomy Never Smoker Vitals History BMI and BSA Data Body Mass Index Body Surface Area  
 26.58 kg/m 2 1.7 m 2 [facility]erred Pharmacy [facility] Name Phone [id number] [address] [place] [address] [phone] Your Updated Medication List  
  
   
 This list is accurate as of: 9/19/17  9:48 AM.  Always use your most recent med list.  
  
  
  
  
 BENADRYL 25 mg capsule Generic drug:  diphenhydrAMINE Take 25 mg by mouth every six (6) hours as needed. cloNIDine HCl 0.1 mg tablet Commonly known as:  CATAPRES Take 0.1 mg by mouth two (2) times a day. DILT- mg XR capsule Generic drug:  dilTIAZem XR Take 120 mg by mouth daily. ELIQUIS 2.5 mg tablet Generic drug:  apixaban  
take 1 tablet by mouth twice a day  
  
 escitalopram oxalate 5 mg tablet Commonly known as:  Suzanna Espinosa Take 5 mg by mouth daily. fluticasone 50 mcg/actuation nasal spray Commonly known as:  FLONASE  
instill 1 spray INTO EACH NOSTRIL ONCE DAILY PRN  
  
 furosemide 20 mg tablet Commonly known as:  LASIX Take 20 mg by mouth daily. glimepiride 1 mg tablet Commonly known as:  AMARYL Take 1 mg by mouth every morning. lisinopril 20 mg tablet Commonly known as:  Katgena Nolan Take 20 mg by mouth daily. multivitamin tablet Commonly known as:  ONE A DAY Take 1 Tab by mouth daily. Omega-3-DHA-EPA-Fish Oil 1,000 mg (120 mg-180 mg) Cap Take 1,000 mg by mouth two (2) times a day. TRICOR 145 mg tablet Generic drug:  fenofibrate nanocrystallized Take 145 mg by mouth daily. VITAMIN D3 1,000 unit tablet Generic drug:  cholecalciferol Take 1,000 Units by mouth daily. We Performed the Following FINE NEEDLE ASP;W/IMAGING GUIDANCE T6826871 CPT(R)] SONO GUIDE NEEDLE BIOPSY [12419 CPT(R)] Follow-up Instructions Return in about 6 months (around 3/19/2018). To-Do List   
 09/19/2017 Pathology:  CYTOLOGY NON-GYN Patient Instructions Call your doctor right away if you have these · Bleeding at the injection site for more than 10 minutes · Redness, warmth, swelling, or drainage at the injection site · Fever of 100.4°F (38.0°C), or higher 
  
  
 Call 911 if you have 
  
Signs of severe allergic reaction (trouble breathing,tongue swelling ) Introducing [facility] & HEALTH SERVICES! [facility] introduces [employer] patient portal. Now you can access parts of your medical record, email your doctor's office, and request medication refills online. 1. In your internet browser, go to [email or web address] 2. Click on the First Time User? Click Here link in the Sign In box. You will see the New Member Sign Up page. 3. Enter your [employer] Access Code exactly as it appears below. You will not need to use this code after youve completed the sign-up process. If you do not sign up before the expiration date, you must request a new code. · [employer] Access Code: [email or web address] Expires: 9/25/2017  2:03 PM 
 
4. Enter the last four digits of your Social Security Number (xxxx) and Date of Birth (mm/dd/yyyy) as indicated and click Submit. You will be taken to the next sign-up page. 5. Create a [employer] ID. This will be your Game[employer] login ID and cannot be changed, so think of one that is secure and easy to remember. 6. Create a [employer] password. You can change your password at any time. 7. Enter your Password Reset Question and Answer. This can be used at a later time if you forget your password. 8. Enter your e-mail address. You will receive e-mail notification when new information is available in [address] 9. Click Sign Up. You can now view and download portions of your medical record. 10. Click the Download Summary menu link to download a portable copy of your medical information. If you have questions, please visit the Frequently Asked Questions section of the [employer] website. Remember, Game[employer] is NOT to be used for urgent needs. For medical emergencies, dial 911. Now available from your iPhone and Android! Please provide this summary of care documentation to your next provider. Your primary care clinician is listed as Car Lieu. If you have any questions after today's visit, please call [phone].

## 2017-09-24 NOTE — PROGRESS NOTES
[facility]               Eber Montejo MD        [address] 78 [phone] Fax [phone]          Patient Information  Date:9/24/2017  Name : Codi Dee 80 y.o.     YOB: 1934         Referred by:  Dr Aspen Jaramillo        Chief Complaint   Patient presents with    Biopsy     Right Thyroid       History of present illness    Codi Dee is a 80 y.o. female  here for fu  of thyroid nodule. He was referred by Dr. Aspen Jaramillo for. Evaluation and management of thyroid nodules found on PET scan. She has history of metastatic melanoma, status post radiation therapy   No prior history of thyroid disorders,  Father had goiter    No dysphagia,dysphonia or dyspnea. Nervous about procedure and BP is high   On eliquis    She has type 2 diabetes mellitus Which is controlled with oral medications,no hypoglycemia    Past Medical History:   Diagnosis Date    Atrial fibrillation (HCC)     Cancer (HCC)     Heart disease     Hyperlipidemia     Hypertension     Incontinence     [facility] ([facility] [address]     Type 2 diabetes mellitus ([facility] [address]        Current Outpatient Prescriptions   Medication Sig    ELIQUIS 2.5 mg tablet take 1 tablet by mouth twice a day    escitalopram oxalate (LEXAPRO) 5 mg tablet Take 5 mg by mouth daily.  DILT- mg XR capsule Take 120 mg by mouth daily.  cloNIDine HCl (CATAPRES) 0.1 mg tablet Take 0.1 mg by mouth two (2) times a day.  TRICOR 145 mg tablet Take 145 mg by mouth daily.  fluticasone (FLONASE) 50 mcg/actuation nasal spray instill 1 spray INTO EACH NOSTRIL ONCE DAILY PRN    furosemide (LASIX) 20 mg tablet Take 20 mg by mouth daily.  lisinopril (PRINIVIL, ZESTRIL) 20 mg tablet Take 20 mg by mouth daily.  glimepiride (AMARYL) 1 mg tablet Take 1 mg by mouth every morning.  diphenhydrAMINE (BENADRYL) 25 mg capsule Take 25 mg by mouth every six (6) hours as needed.     Omega-3-DHA-EPA-Fish Oil 1,000 mg (120 mg-180 mg) cap Take 1,000 mg by mouth two (2) times a day.  multivitamin (ONE A DAY) tablet Take 1 Tab by mouth daily.  cholecalciferol (VITAMIN D3) 1,000 unit tablet Take 1,000 Units by mouth daily. No current facility-administered medications for this visit. Review of Systems:  -   - Respiratory: no cough or shortness of breath  - Gastrointestinal: no  abdominal pain  - Musculoskeletal: + joint pains + weakness  - Integumentary: no rashes  - Neurological: no numbness, tingling, or headaches  - Psychiatric: no depression or anxiety  - Endocrine: no heat or cold intolerance, no polyuria or polydipsia    Physical Examination:  Blood pressure 155/81, pulse 83, temperature 97 °F (36.1 °C), temperature source Oral, resp. rate 16, height 5' 2\" (1.575 m), weight 145 lb 4.8 oz (65.9 kg), SpO2 98 %. Body mass index is 26.58 kg/(m^2). - General: pleasant, no distress, good eye contact  - HEENT: no exopthalmos, no periorbital edema, no scleral/conjunctival injection, EOMI, no lid lag or stare  - Neck: supple,    - Cardiovascular: regular,  normal S1 and S2, no murmurs  - Respiratory: clear to auscultation bilaterally  - Gastrointestinal: soft, nontender, nondistended, BS +  - Musculoskeletal: no tremors, no edema  - Neurological: alert and oriented   - Psychiatric: normal mood and affect  - Skin - normal turgor    Data Reviewed:         [] Reviewed labs    Assessment/Plan:     Multinodular goiter/    She is euthyroid clinically and has no compressive symptoms. Right thyroid nodule with microcalcifications - given hx of XRT , FNA was planned   Tolerate FNA well     Type 2 diabetes mellitus    Metastatic melanoma - in remission      Hypertension: Continue with present medications        Follow-up Disposition:  Return in about 6 months (around 3/19/2018). Thank you for allowing me to participate in the care of this patient.     Yanira Tabor MD

## 2017-10-03 ENCOUNTER — TELEPHONE (OUTPATIENT)
Dept: ENDOCRINOLOGY | Age: 82
End: 2017-10-03

## 2017-10-03 NOTE — TELEPHONE ENCOUNTER
Informed pt that Afirma biopsy came back negative for cancer per Dr Abdon Esteves. Pt verbalized understanding.

## 2018-03-20 ENCOUNTER — OFFICE VISIT (OUTPATIENT)
Dept: ENDOCRINOLOGY | Age: 83
End: 2018-03-20

## 2018-03-20 VITALS
WEIGHT: 151 LBS | DIASTOLIC BLOOD PRESSURE: 63 MMHG | HEART RATE: 86 BPM | SYSTOLIC BLOOD PRESSURE: 155 MMHG | RESPIRATION RATE: 16 BRPM | HEIGHT: 62 IN | BODY MASS INDEX: 27.79 KG/M2 | TEMPERATURE: 97.5 F | OXYGEN SATURATION: 97 %

## 2018-03-20 DIAGNOSIS — E04.9 NODULAR GOITER: Primary | ICD-10-CM

## 2018-03-20 DIAGNOSIS — I10 ESSENTIAL HYPERTENSION: ICD-10-CM

## 2018-03-20 LAB
GLUCOSE POC: 149 MG/DL
HBA1C MFR BLD HPLC: 6.6 %

## 2018-03-20 NOTE — MR AVS SNAPSHOT
[address] [facility] [address] 
[phone] Patient: Jose David Cherry MRN: [id number] :1934 Visit Information Date & Time Provider Department Dept. Phone Encounter #  
 3/20/2018  9:45 AM Marylene Monica, MD [facility] Endocr[facility] [phone] [id number] Follow-up Instructions Return in about 1 year (around 3/20/2019). Upcoming Health Maintenance Date Due HEMOGLOBIN A1C Q6M 1934 LIPID PANEL Q1 1934 FOOT EXAM Q1 1944 MICROALBUMIN Q1 1944 EYE EXAM RETINAL OR DILATED Q1 1944 DTaP/Tdap/Td series (1 - Tdap) 1955 ZOSTER VACCINE AGE 60> 1994 GLAUCOMA SCREENING Q2Y 1999 Bone Densitometry (Dexa) Screening 1999 Pneumococcal 65+ Low/Medium Risk (1 of 2 - PCV13) 1999 Influenza Age 5 to Adult 2017 MEDICARE YEARLY EXAM 3/14/2018 Allergies as of 3/20/2018  Review Complete On: 3/20/2018 By: Marylene Monica, MD  
  
 Severity Noted Reaction Type Reactions Penicillins  2017    Swelling Tramadol  2017    Nausea and Vomiting Current Immunizations  Never Reviewed No immunizations on file. Not reviewed this visit You Were Diagnosed With   
  
 Codes Comments Nodular goiter    -  Primary ICD-10-CM: E04.9 ICD-9-CM: 384. 9 Vitals BP Pulse Temp Resp Height(growth percentile) Weight(growth percentile) 155/63 (BP 1 Location: Left arm, BP Patient Position: Sitting) 86 97.5 °F (36.4 °C) (Oral) 16 5' 2\" (1.575 m) 151 lb (68.5 kg) SpO2 BMI OB Status Smoking Status 97% 27.62 kg/m2 Hysterectomy Never Smoker Vitals History BMI and BSA Data Body Mass Index Body Surface Area  
 27.62 kg/m 2 1.73 m 2 Preferred Pharmacy Pharmacy Name Phone [id number] [address] [phone] Your Updated Medication List  
  
   
This list is accurate as of 3/20/18 10:34 AM.  Always use your most recent med list.  
  
  
  
  
 BENADRYL 25 mg capsule Generic drug:  diphenhydrAMINE Take 25 mg by mouth every six (6) hours as needed. cloNIDine HCl 0.1 mg tablet Commonly known as:  CATAPRES Take 0.1 mg by mouth two (2) times a day. DILT- mg XR capsule Generic drug:  dilTIAZem XR Take 120 mg by mouth daily. ELIQUIS 2.5 mg tablet Generic drug:  apixaban  
take 1 tablet by mouth twice a day  
  
 escitalopram oxalate 5 mg tablet Commonly known as:  Jazzy Apo Take 5 mg by mouth daily. fluticasone 50 mcg/actuation nasal spray Commonly known as:  FLONASE  
instill 1 spray INTO EACH NOSTRIL ONCE DAILY PRN  
  
 furosemide 20 mg tablet Commonly known as:  LASIX Take 20 mg by mouth daily. glimepiride 1 mg tablet Commonly known as:  AMARYL Take 1 mg by mouth every morning. lisinopril 20 mg tablet Commonly known as:  Antonia [place] Take 20 mg by mouth daily. multivitamin tablet Commonly known as:  ONE A DAY Take 1 Tab by mouth daily. omega 3-DHA-EPA-fish oil 1,000 mg (120 mg-180 mg) capsule Take 1,000 mg by mouth two (2) times a day. TRICOR 145 mg tablet Generic drug:  fenofibrate nanocrystallized Take 145 mg by mouth daily. VITAMIN D3 1,000 unit tablet Generic drug:  cholecalciferol Take 1,000 Units by mouth daily. We Performed the Following AMB POC GLUCOSE, QUANTITATIVE, BLOOD [88543 CPT(R)] AMB POC HEMOGLOBIN A1C [72391 CPT(R)] Follow-up Instructions Return in about 1 year (around 3/20/2019). Introducing [facility]! [facility] introduces [employer] patient portal. Now you can access parts of your medical record, email your doctor's office, and request medication refills online. 1. In your internet browser, go to [email or web address] 2. Click on the First Time User? Click Here link in the Sign In box. You will see the New Member Sign Up page. 3. Enter your [employer] Access Code exactly as it appears below. You will not need to use this code after youve completed the sign-up process. If you do not sign up before the expiration date, you must request a new code. · [employer] Access Code: [email or web address] Expires: 6/18/2018 10:34 AM 
 
4. Enter the last four digits of your Social Security Number (xxxx) and Date of Birth (mm/dd/yyyy) as indicated and click Submit. You will be taken to the next sign-up page. 5. Create a [employer] ID. This will be your [employer] login ID and cannot be changed, so think of one that is secure and easy to remember. 6. Create a [employer] password. You can change your password at any time. 7. Enter your Password Reset Question and Answer. This can be used at a later time if you forget your password. 8. Enter your e-mail address. You will receive e-mail notification when new information is available in [address] 9. Click Sign Up. You can now view and download portions of your medical record. 10. Click the Download Summary menu link to download a portable copy of your medical information. If you have questions, please visit the Frequently Asked Questions section of the [employer] website. Remember, [employer] is NOT to be used for urgent needs. For medical emergencies, dial 911. Now available from your iPhone and Android! Please provide this summary of care documentation to your next provider. Your primary care clinician is listed as Rajeev Rivera. If you have any questions after today's visit, please call [phone].

## 2018-03-20 NOTE — PROGRESS NOTES
[facility]               Iker Nj MD        [address] 78 [phone] Fax [phone]          Patient Information  Date:3/21/2018  Name : Vera Carlisle 80 y.o.     YOB: 1934         Referred by:  Dr Demetrius Askew        Chief Complaint   Patient presents with    Diabetes    Thyroid Problem       History of present illness    Vera Carlisle is a 80 y.o. female  here for fu  of thyroid nodule. He was referred by Dr. Demetrius Askew for. Evaluation and management of thyroid nodules found on PET scan. She has history of metastatic melanoma, status post radiation therapy   Father had goiter  She is able to eat well,      No dysphagia,dysphonia or dyspnea. She has type 2 diabetes mellitus    Past Medical History:   Diagnosis Date    Atrial fibrillation (HCC)     Cancer (HCC)     Heart disease     Hyperlipidemia     Hypertension     Incontinence     Melanoma ([address]     Type 2 diabetes mellitus ([facility] [address]        Current Outpatient Prescriptions   Medication Sig    ELIQUIS 2.5 mg tablet take 1 tablet by mouth twice a day    escitalopram oxalate (LEXAPRO) 5 mg tablet Take 5 mg by mouth daily.  DILT- mg XR capsule Take 120 mg by mouth daily.  cloNIDine HCl (CATAPRES) 0.1 mg tablet Take 0.1 mg by mouth two (2) times a day.  TRICOR 145 mg tablet Take 145 mg by mouth daily.  fluticasone (FLONASE) 50 mcg/actuation nasal spray instill 1 spray INTO EACH NOSTRIL ONCE DAILY PRN    furosemide (LASIX) 20 mg tablet Take 20 mg by mouth daily.  lisinopril (PRINIVIL, ZESTRIL) 20 mg tablet Take 20 mg by mouth daily.  glimepiride (AMARYL) 1 mg tablet Take 1 mg by mouth every morning.  diphenhydrAMINE (BENADRYL) 25 mg capsule Take 25 mg by mouth every six (6) hours as needed.  Omega-3-DHA-EPA-Fish Oil 1,000 mg (120 mg-180 mg) cap Take 1,000 mg by mouth two (2) times a day.     multivitamin (ONE A DAY) tablet Take 1 Tab by mouth daily.    cholecalciferol (VITAMIN D3) 1,000 unit tablet Take 1,000 Units by mouth daily. No current facility-administered medications for this visit. Review of Systems:  -   - Integumentary: no rashes  - Neurological: no numbness, tingling, or headaches  - Psychiatric: no depression or anxiety  - Endocrine: no heat or cold intolerance, no polyuria or polydipsia    Physical Examination:  Blood pressure 155/63, pulse 86, temperature 97.5 °F (36.4 °C), temperature source Oral, resp. rate 16, height 5' 2\" (1.575 m), weight 151 lb (68.5 kg), SpO2 97 %. Body mass index is 27.62 kg/(m^2). - General: pleasant, no distress, good eye contact  - HEENT: no exopthalmos, no periorbital edema, no scleral/conjunctival injection, EOMI, no lid lag or stare  - Neck: supple,    - Cardiovascular: regular,  normal S1 and S2  - Respiratory: clear to auscultation bilaterally  - Gastrointestinal: soft, nontender, nondistended, BS +  - Musculoskeletal: no tremors, no edema  - Neurological: alert and oriented   - Psychiatric: normal mood and affect  - Skin - normal turgor    Diabetic foot exam: March 2018    Left:     Vibratory sensation absent   Filament test normal sensation with micro filament   Pulse DP: 1+    Deformities: Bunion  Right:    Vibratory sensation absent   Filament test normal sensation with micro filament   Pulse DP: 1+   Deformities: Bunion      Data Reviewed:         [] Reviewed labs    Assessment/Plan:     Multinodular goiter/    She is euthyroid clinically and has no compressive symptoms. Right thyroid nodule with microcalcifications -  hx of XRT   Fine-needle aspiration biopsy in September 2017 was benign,AFIRMA 9/17    Type 2 diabetes mellitus - managed by PCP     Metastatic melanoma - in remission      Hypertension: Continue with present medications        Follow-up Disposition:  Return in about 1 year (around 3/20/2019).     Thank you for allowing me to participate in the care of this patient.     Fe Phan MD

## 2018-03-20 NOTE — PROGRESS NOTES
Julianne Murry is a 80 y.o. female here for   Chief Complaint   Patient presents with    Diabetes    Thyroid Problem       Functional glucose monitor and record keeping system? - yes  Eye exam within last year? - Fall 2017 Dr. Shantanu Gutierrez exam within last year? - due    1. Have you been to the ER, urgent care clinic since your last visit? Hospitalized since your last visit? -no    2. Have you seen or consulted any other health care providers outside of the [address] [place] [address] since your last visit?   Include any pap smears or colon screening.- had DEXA and PCP      No results found for: HBA1C, HGBE8, UGH4IPRE, GUV8FAEP    Wt Readings from Last 3 Encounters:   09/19/17 145 lb 4.8 oz (65.9 kg)   08/01/17 146 lb 14.4 oz (66.6 kg)   06/27/17 144 lb 9.6 oz (65.6 kg)     Temp Readings from Last 3 Encounters:   09/19/17 97 °F (36.1 °C) (Oral)   08/01/17 98.1 °F (36.7 °C) (Oral)   06/27/17 98.1 °F (36.7 °C) (Oral)     BP Readings from Last 3 Encounters:   09/19/17 155/81   08/01/17 178/85   06/27/17 160/78     Pulse Readings from Last 3 Encounters:   09/19/17 83   08/01/17 90   06/27/17 83

## 2018-03-21 PROBLEM — E11.65 TYPE 2 DIABETES MELLITUS WITH HYPERGLYCEMIA, WITHOUT LONG-TERM CURRENT USE OF INSULIN (HCC): Status: RESOLVED | Noted: 2017-06-27 | Resolved: 2018-03-21

## 2018-03-21 PROBLEM — I10 ESSENTIAL HYPERTENSION: Status: ACTIVE | Noted: 2018-03-21

## 2018-10-11 LAB — CREATININE, EXTERNAL: 1.2

## 2019-03-19 ENCOUNTER — OFFICE VISIT (OUTPATIENT)
Dept: ENDOCRINOLOGY | Age: 84
End: 2019-03-19

## 2019-03-19 ENCOUNTER — HOSPITAL ENCOUNTER (OUTPATIENT)
Dept: LAB | Age: 84
Discharge: HOME | End: 2019-03-19
Payer: MEDICARE

## 2019-03-19 VITALS
WEIGHT: 145 LBS | OXYGEN SATURATION: 97 % | TEMPERATURE: 96.9 F | DIASTOLIC BLOOD PRESSURE: 88 MMHG | HEART RATE: 101 BPM | HEIGHT: 62 IN | RESPIRATION RATE: 18 BRPM | SYSTOLIC BLOOD PRESSURE: 171 MMHG | BODY MASS INDEX: 26.68 KG/M2

## 2019-03-19 DIAGNOSIS — E04.9 NODULAR GOITER: Primary | ICD-10-CM

## 2019-03-19 PROCEDURE — 84439 ASSAY OF FREE THYROXINE: CPT

## 2019-03-19 PROCEDURE — 84443 ASSAY THYROID STIM HORMONE: CPT

## 2019-03-19 PROCEDURE — 36415 COLL VENOUS BLD VENIPUNCTURE: CPT

## 2019-03-19 RX ORDER — TOLTERODINE TARTRATE 2 MG/1
TABLET, EXTENDED RELEASE ORAL
Refills: 0 | COMMUNITY
Start: 2019-03-11

## 2019-03-19 NOTE — PROGRESS NOTES
Thyroid Ultrasound Report Patient Information Date:3/19/2019 Name : Tyrel Kc 80 y.o.    
YOB: 1934 Indication: Thyroid nodule Thyroid gland is diffusely heterogenous, right lobe measures 2.6 x 1.8 x 1.1 cm. Left lobe measures 2.6 x 1.3 x 1.6 cm. Isthmus measures 0.34 cm There is a nodule in the right lobe measuring 2 x 1.5 x 1.3 with intranodular vascularity,  microcalcification's. Impression:  
Nodular goiter. Right thyroid nodule measuring 2 cm microcalcifications, nodule has not changed compared to prior ultrasound.   Fine-needle aspiration biopsy was benign in the past. 
 
 
 
 
  
Ky Madison MD

## 2019-03-19 NOTE — PROGRESS NOTES
All health maintenance and other pertinent information has been reviewed in preparation for today's office visit. Patient presents in the office today for: Chief Complaint Patient presents with  Thyroid Problem 1. Have you been to the ER, urgent care clinic since your last visit? Hospitalized since your last visit? No 
 
2. Have you seen or consulted any other health care providers outside of the [address] [place] [address] since your last visit? Include any pap smears or colon screening. No  
 
 
Wt Readings from Last 3 Encounters:  
03/19/19 145 lb (65.8 kg) 03/20/18 151 lb (68.5 kg) 09/19/17 145 lb 4.8 oz (65.9 kg) Temp Readings from Last 3 Encounters:  
03/19/19 96.9 °F (36.1 °C) (Oral) 03/20/18 97.5 °F (36.4 °C) (Oral) 09/19/17 97 °F (36.1 °C) (Oral) BP Readings from Last 3 Encounters:  
03/19/19 171/88  
03/20/18 155/63  
09/19/17 155/81 Pulse Readings from Last 3 Encounters:  
03/19/19 (!) 101  
03/20/18 86  
09/19/17 83

## 2019-03-19 NOTE — PROGRESS NOTES
[facility] DIABETES AND ENDOCRINOLOGY Antony Gutierres MD 
 
    [address] 78 [phone] Fax [phone] Patient Information Date:3/19/2019 Name : Betty Cuevas 80 y.o.    
YOB: 1934 Referred by:  Dr Marcellus Lofton Chief Complaint Patient presents with  Thyroid Problem History of present illness Betty Cuevas is a 80 y.o. female  here for fu  of thyroid nodule. He was referred by Dr. Marcellus Lofton for. Evaluation and management of thyroid nodules found on PET scan. She has history of metastatic melanoma, status post radiation therapy Father had goiter She has not noted any change in the size of the neck, no dysphagia, dyspnea. No nervousness, no weight loss. No recent thyroid function test. 
 
 
Past Medical History:  
Diagnosis Date  Atrial fibrillation ([facility]  Cancer ([facility]  Heart disease  Hyperlipidemia  Hypertension  Incontinence  Melanoma ([facility]  Type 2 diabetes mellitus ([facility] Current Outpatient Medications Medication Sig  tolterodine (DETROL) 2 mg tablet  ELIQUIS 2.5 mg tablet take 1 tablet by mouth twice a day  DILT- mg XR capsule Take 120 mg by mouth daily.  cloNIDine HCl (CATAPRES) 0.1 mg tablet Take 0.1 mg by mouth two (2) times a day.  furosemide (LASIX) 20 mg tablet Take 20 mg by mouth daily.  lisinopril (PRINIVIL, ZESTRIL) 20 mg tablet Take 20 mg by mouth daily.  glimepiride (AMARYL) 1 mg tablet Take 1 mg by mouth every morning.  diphenhydrAMINE (BENADRYL) 25 mg capsule Take 25 mg by mouth every six (6) hours as needed.  Omega-3-DHA-EPA-Fish Oil 1,000 mg (120 mg-180 mg) cap Take 1,000 mg by mouth two (2) times a day.  multivitamin (ONE A DAY) tablet Take 1 Tab by mouth daily.  cholecalciferol (VITAMIN D3) 1,000 unit tablet Take 1,000 Units by mouth daily.  escitalopram oxalate (LEXAPRO) 5 mg tablet Take 5 mg by mouth daily.  TRICOR 145 mg tablet Take 145 mg by mouth daily.  fluticasone (FLONASE) 50 mcg/actuation nasal spray instill 1 spray INTO EACH NOSTRIL ONCE DAILY PRN No current facility-administered medications for this visit. Review of Systems: 
-  
- Integumentary: no rashes - Neurological: no numbness, tingling, or headaches - Psychiatric: no depression or anxiety - Endocrine: no heat or cold intolerance, no polyuria or polydipsia Physical Examination: 
Blood pressure 171/88, pulse (!) 101, temperature 96.9 °F (36.1 °C), temperature source Oral, resp. rate 18, height 5' 2\" (1.575 m), weight 145 lb (65.8 kg), SpO2 97 %. Body mass index is 26.52 kg/m². - General: pleasant, no distress, good eye contact 
- HEENT: no exopthalmos, no periorbital edema, no scleral/conjunctival injection, EOMI, no lid lag or stare 
- Neck: supple,   
- Cardiovascular: regular,  normal S1 and S2 
- Respiratory: clear to auscultation bilaterally - Gastrointestinal: soft, nontender, nondistended, BS + 
- Musculoskeletal: no tremors, no edema 
- Neurological: alert and oriented - Psychiatric: normal mood and affect 
- Skin - normal turgor Diabetic foot exam: March 2018 Left:   
 Vibratory sensation absent Filament test normal sensation with micro filament Pulse DP: 1+ Deformities: Bunion Right:  
 Vibratory sensation absent Filament test normal sensation with micro filament Pulse DP: 1+ Deformities: Bunion Data Reviewed:  
 
 
 
[] Reviewed labs Assessment/Plan:  
 
Multinodular goiter/ She has no compressive symptoms Right thyroid nodule 2 cm with microcalcifications -  hx of XRT Fine-needle aspiration biopsy in September 2017 was benign,AFIRMA 9/17 Ultrasound thyroid March 2019 right thyroid nodule 2 cm no change Thyroid function test today Type 2 diabetes mellitus - managed by PCP  
 
 Metastatic melanoma - in remission Hypertension: Continue with present medications Return in 1-1/2-year Follow-up Disposition: Not on File Thank you for allowing me to participate in the care of this patient.  
 
Rimma Aponte MD

## 2019-03-20 LAB
T4 FREE SERPL-MCNC: 1.28 NG/DL (ref 0.82–1.77)
TSH SERPL DL<=0.005 MIU/L-ACNC: 3.57 UIU/ML (ref 0.45–4.5)

## 2020-09-15 ENCOUNTER — OFFICE VISIT (OUTPATIENT)
Dept: ENDOCRINOLOGY | Age: 85
End: 2020-09-15
Payer: MEDICARE

## 2020-09-15 VITALS
BODY MASS INDEX: 25.4 KG/M2 | SYSTOLIC BLOOD PRESSURE: 182 MMHG | RESPIRATION RATE: 18 BRPM | HEART RATE: 81 BPM | DIASTOLIC BLOOD PRESSURE: 87 MMHG | HEIGHT: 62 IN | TEMPERATURE: 97.5 F | WEIGHT: 138 LBS

## 2020-09-15 DIAGNOSIS — E04.9 NODULAR GOITER: Primary | ICD-10-CM

## 2020-09-15 DIAGNOSIS — I10 ESSENTIAL HYPERTENSION: ICD-10-CM

## 2020-09-15 LAB
T4 FREE SERPL-MCNC: 1.2 NG/DL (ref 0.8–1.5)
TSH SERPL DL<=0.05 MIU/L-ACNC: 2.48 UIU/ML (ref 0.36–3.74)

## 2020-09-15 PROCEDURE — G8419 CALC BMI OUT NRM PARAM NOF/U: HCPCS | Performed by: INTERNAL MEDICINE

## 2020-09-15 PROCEDURE — G8432 DEP SCR NOT DOC, RNG: HCPCS | Performed by: INTERNAL MEDICINE

## 2020-09-15 PROCEDURE — 1101F PT FALLS ASSESS-DOCD LE1/YR: CPT | Performed by: INTERNAL MEDICINE

## 2020-09-15 PROCEDURE — G8536 NO DOC ELDER MAL SCRN: HCPCS | Performed by: INTERNAL MEDICINE

## 2020-09-15 PROCEDURE — 99214 OFFICE O/P EST MOD 30 MIN: CPT | Performed by: INTERNAL MEDICINE

## 2020-09-15 PROCEDURE — 76536 US EXAM OF HEAD AND NECK: CPT | Performed by: INTERNAL MEDICINE

## 2020-09-15 PROCEDURE — G8753 SYS BP > OR = 140: HCPCS | Performed by: INTERNAL MEDICINE

## 2020-09-15 PROCEDURE — G8400 PT W/DXA NO RESULTS DOC: HCPCS | Performed by: INTERNAL MEDICINE

## 2020-09-15 PROCEDURE — G8754 DIAS BP LESS 90: HCPCS | Performed by: INTERNAL MEDICINE

## 2020-09-15 PROCEDURE — G8427 DOCREV CUR MEDS BY ELIG CLIN: HCPCS | Performed by: INTERNAL MEDICINE

## 2020-09-15 PROCEDURE — 1090F PRES/ABSN URINE INCON ASSESS: CPT | Performed by: INTERNAL MEDICINE

## 2020-09-15 RX ORDER — BRINZOLAMIDE 10 MG/ML
1 SUSPENSION/ DROPS OPHTHALMIC 2 TIMES DAILY
COMMUNITY

## 2020-09-15 RX ORDER — MICONAZOLE NITRATE 2 %
1 CREAM WITH APPLICATOR VAGINAL 2 TIMES DAILY
COMMUNITY

## 2020-09-15 RX ORDER — LIDOCAINE 50 MG/G
1 PATCH TOPICAL AS NEEDED
COMMUNITY

## 2020-09-15 RX ORDER — DIAPER,BRIEF,INFANT-TODD,DISP
1 EACH MISCELLANEOUS DAILY
COMMUNITY

## 2020-09-15 RX ORDER — ACETAMINOPHEN 325 MG/1
325 TABLET ORAL
COMMUNITY

## 2020-09-15 NOTE — LETTER
9/19/20 Patient: Csaper Green YOB: 1934 Date of Visit: 9/15/2020 Sherry Castro MD 
[address] [place] [address] VIA Facsimile: [phone] Dear Sherry Castro MD, Thank you for referring Ms. Casper Green to [address] [place] [address] for evaluation. My notes for this consultation are attached. If you have questions, please do not hesitate to call me. I look forward to following your patient along with you. Sincerely, Marifer Carlson MD

## 2020-09-15 NOTE — PROGRESS NOTES
Lexus Vidal MD           Patient Information  Date:9/15/2020  Name : Kimberly Chau 80 y.o.     YOB: 1934         Referred by:  Dr Kee Klein        Chief Complaint   Patient presents with    Thyroid Problem    Ultrasound       History of present illness    Kimberly Chau is a 80 y.o. female  here for fu  of thyroid nodule. He was referred by Dr. Kee Klein for. Evaluation and management of thyroid nodules found on PET scan. She has history of metastatic melanoma, status post radiation therapy   Father had goiter  She has not noted any change in the size of the neck, no dysphagia, dyspnea. No nervousness, no weight loss. No recent thyroid function test.    Blood pressure is elevated: She is nervous about the ultrasound  No dysphagia, dyspnea  No chest pain or shortness of breath    Past Medical History:   Diagnosis Date    Atrial fibrillation (HCC)     [facility] ([facility]     Heart disease     Hyperlipidemia     Hypertension     Incontinence     [facility]     Type 2 diabetes mellitus (HCC)        Current Outpatient Medications   Medication Sig    tolterodine (DETROL) 2 mg tablet     ELIQUIS 2.5 mg tablet take 1 tablet by mouth twice a day    escitalopram oxalate (LEXAPRO) 5 mg tablet Take 5 mg by mouth daily.  DILT- mg XR capsule Take 120 mg by mouth daily.  cloNIDine HCl (CATAPRES) 0.1 mg tablet Take 0.1 mg by mouth two (2) times a day.  TRICOR 145 mg tablet Take 145 mg by mouth daily.  fluticasone (FLONASE) 50 mcg/actuation nasal spray instill 1 spray INTO EACH NOSTRIL ONCE DAILY PRN    furosemide (LASIX) 20 mg tablet Take 20 mg by mouth daily.  lisinopril (PRINIVIL, ZESTRIL) 20 mg tablet Take 20 mg by mouth daily.  glimepiride (AMARYL) 1 mg tablet Take 1 mg by mouth every morning.  diphenhydrAMINE (BENADRYL) 25 mg capsule Take 25 mg by mouth every six (6) hours as needed.     Omega-3-DHA-EPA-Fish Oil 1,000 mg (120 mg-180 mg) cap Take 1,000 mg by mouth two (2) times a day.  multivitamin (ONE A DAY) tablet Take 1 Tab by mouth daily.  cholecalciferol (VITAMIN D3) 1,000 unit tablet Take 1,000 Units by mouth daily. No current facility-administered medications for this visit. Review of Systems:  -   - Integumentary: no rashes  - Neurological: no numbness, tingling, or headaches  - Psychiatric: no depression or anxiety  - Endocrine: no heat or cold intolerance, no polyuria or polydipsia    Physical Examination:  Height 5' 2\" (1.575 m). Body mass index is 26.52 kg/m².   - General: pleasant, no distress, good eye contact  - HEENT: no exopthalmos, no periorbital edema, no scleral/conjunctival injection, EOMI, no lid lag or stare  - Neck: supple,    - Cardiovascular: regular,  normal S1 and S2  - Respiratory: clear to auscultation bilaterally  - Gastrointestinal: soft, nontender, nondistended, BS +  - Musculoskeletal: no tremors, no edema  - Neurological: alert and oriented   - Psychiatric: normal mood and affect  - Skin - normal turgor    Diabetic foot exam: March 2018    Left:     Vibratory sensation absent   Filament test normal sensation with micro filament   Pulse DP: 1+    Deformities: Bunion  Right:    Vibratory sensation absent   Filament test normal sensation with micro filament   Pulse DP: 1+   Deformities: Bunion      Data Reviewed:         [] Reviewed labs    Assessment/Plan:     Multinodular goiter-new isthmus nodule    She has no compressive symptoms  Right thyroid nodule 2 cm with microcalcifications -  hx of XRT   Fine-needle aspiration biopsy in September 2017 was benign,AFIRMA 9/17  Ultrasound thyroid March 2019 right thyroid nodule 2 cm no change  Ultrasound thyroid 2020: Right thyroid nodule 2 x 1.3 x 1 cm, isthmus nodule not clearly defined measuring 1.8 x 0.7 x 0.4 cm  We will monitor      Type 2 diabetes mellitus - managed by PCP     Metastatic melanoma - in remission      Hypertension: Continue with present medications  Elevated      Return in 1-1/2-year        Thank you for allowing me to participate in the care of this patient.     Tmi Dee MD

## 2020-09-15 NOTE — PROGRESS NOTES
Shireen Garrison is a 80 y.o. female here for   Chief Complaint   Patient presents with    Thyroid Problem    Ultrasound       1. Have you been to the ER, urgent care clinic since your last visit? Hospitalized since your last visit? -no    2. Have you seen or consulted any other health care providers outside of the [address] [place] [address] since your last visit? Include any pap smears or colon screening. -PCP and oncology    Order placed for pt per verbal order with read back from Dr. Zeina Altman 09/15/20

## 2020-09-19 NOTE — PROGRESS NOTES
Thyroid Ultrasound Report    Patient Information  Date:9/19/2020  Name : Vern Lloyd 80 y.o.     YOB: 1934           Indication: Thyroid nodule    Thyroid gland is diffusely heterogenous, right lobe measures 2.6 x 1.8 x 1.1 cm. Left lobe measures 2.6 x 1.3 x 1.6 cm. Isthmus measures 0.34 cm  There is a nodule in the right lobe measuring 2 x 1.5 x 1.3 with intranodular vascularity,  microcalcification's. There was another nodule which is new measuring 1.8 x 3.7 x 0.4 cm in the isthmus, borders not clearly defined. No microcalcifications  Impression:   Nodular goiter. Right thyroid nodule measuring 2 cm microcalcifications, nodule has not changed compared to prior ultrasound. Fine-needle aspiration biopsy was benign in the past.  Isthmus nodule new measuring 1.8 cm  Continue to monitor.              Becca Echeverria MD

## 2022-03-19 PROBLEM — E04.9 NODULAR GOITER: Status: ACTIVE | Noted: 2017-06-27

## 2022-03-20 PROBLEM — I10 ESSENTIAL HYPERTENSION: Status: ACTIVE | Noted: 2018-03-21

## 2022-06-28 ENCOUNTER — TELEPHONE (OUTPATIENT)
Dept: ENT CLINIC | Age: 87
End: 2022-06-28

## 2022-06-28 NOTE — TELEPHONE ENCOUNTER
[facility] is referring this pt for an excision of a basal cell carcinoma on the left cheek. Where is the best place to schedule this pt?

## 2022-08-30 ENCOUNTER — OFFICE VISIT (OUTPATIENT)
Dept: ENT CLINIC | Age: 87
End: 2022-08-30
Payer: MEDICARE

## 2022-08-30 VITALS
WEIGHT: 140 LBS | RESPIRATION RATE: 17 BRPM | HEIGHT: 62 IN | HEART RATE: 63 BPM | SYSTOLIC BLOOD PRESSURE: 140 MMHG | OXYGEN SATURATION: 98 % | BODY MASS INDEX: 25.76 KG/M2 | DIASTOLIC BLOOD PRESSURE: 80 MMHG

## 2022-08-30 DIAGNOSIS — H90.3 SENSORINEURAL HEARING LOSS (SNHL) OF BOTH EARS: Primary | ICD-10-CM

## 2022-08-30 DIAGNOSIS — R04.0 EPISTAXIS: ICD-10-CM

## 2022-08-30 DIAGNOSIS — C44.319 BASAL CELL CARCINOMA (BCC) OF SKIN OF OTHER PART OF FACE: ICD-10-CM

## 2022-08-30 PROCEDURE — 1123F ACP DISCUSS/DSCN MKR DOCD: CPT | Performed by: OTOLARYNGOLOGY

## 2022-08-30 PROCEDURE — G8417 CALC BMI ABV UP PARAM F/U: HCPCS | Performed by: OTOLARYNGOLOGY

## 2022-08-30 PROCEDURE — G8510 SCR DEP NEG, NO PLAN REQD: HCPCS | Performed by: OTOLARYNGOLOGY

## 2022-08-30 PROCEDURE — 99203 OFFICE O/P NEW LOW 30 MIN: CPT | Performed by: OTOLARYNGOLOGY

## 2022-08-30 PROCEDURE — 1090F PRES/ABSN URINE INCON ASSESS: CPT | Performed by: OTOLARYNGOLOGY

## 2022-08-30 PROCEDURE — 1101F PT FALLS ASSESS-DOCD LE1/YR: CPT | Performed by: OTOLARYNGOLOGY

## 2022-08-30 PROCEDURE — G8536 NO DOC ELDER MAL SCRN: HCPCS | Performed by: OTOLARYNGOLOGY

## 2022-08-30 PROCEDURE — G8427 DOCREV CUR MEDS BY ELIG CLIN: HCPCS | Performed by: OTOLARYNGOLOGY

## 2022-08-30 NOTE — LETTER
8/30/2022    Patient: Ayana Ibarra   YOB: 1934   Date of Visit: 8/30/2022     Maryanne Zavala MD  [address] [facility] [address]  Via Fax: [phone]     Dermatology [place] [address]  [address] [facility]  [phone]  Via Fax: [phone]    Dear Maryanne Zavala MD  Dermatology [place] [address],      Thank you for referring Ms. Concepcion Garcia to [facility] EAR NOSE AND THROAT [facility], THROAT AND ALLERGY CARE for evaluation. My notes for this consultation are attached. If you have questions, please do not hesitate to call me. I look forward to following your patient along with you.       Sincerely,    Yeny Lawson MD

## 2022-08-30 NOTE — Clinical Note
Check with PCP Dr. Lou Fisher or cardiologist Dr. Lesvia Nieves regarding holding her Eliquis for office procedure

## 2022-08-30 NOTE — PROGRESS NOTES
Subjective: Lennie Gowers   80 y.o.   1934     New Patient Visit  08/30/22     Location -left cheek    Quality -BCCA    Severity -mild to moderate    Duration -few months    Timing -ongoing    Context -sent by dermatology for left cheek basal cell carcinoma, surgical consultation. History of melanoma of the left arm    Modifying Features -none    Associated symptoms/signs -hearing loss, history of epistaxis      Review of Systems  Review of Systems   Constitutional:  Negative for chills and fever. HENT:  Positive for hearing loss and nosebleeds. Negative for ear pain and tinnitus. Eyes:  Negative for blurred vision and double vision. Respiratory:  Negative for cough, sputum production and shortness of breath. Cardiovascular:  Negative for chest pain and palpitations. Gastrointestinal:  Negative for heartburn, nausea and vomiting. Musculoskeletal:  Positive for joint pain and neck pain. Skin: Negative. Neurological:  Positive for weakness. Negative for dizziness, speech change and headaches. Endo/Heme/Allergies:  Negative for environmental allergies. Does not bruise/bleed easily. Psychiatric/Behavioral:  Negative for memory loss. The patient does not have insomnia.         Past Medical History:   Diagnosis Date    Atrial fibrillation ([address]     Cancer ([facility]     Heart disease     Hyperlipidemia     Hypertension     Incontinence     Melanoma ([facility]     Type 2 diabetes mellitus ([facility]      Past Surgical History:   Procedure Laterality Date    HX ATRIAL SEPTAL DEFECT REPAIR  1992    HX CATARACT REMOVAL Bilateral 2016    HX CHOLECYSTECTOMY  1993    HX COLONOSCOPY  08/2017    HX CYST REMOVAL  1969    on spine    HX HYSTERECTOMY  1988    HX KNEE ARTHROSCOPY  2011    HX LIPOMA RESECTION  2014    HX OTHER SURGICAL  2013    Melanoma excision    HX OTHER SURGICAL  1966    Diverticulum    HX TONSILLECTOMY  1943      Family History   Problem Relation Age of Onset    Cancer Mother         breast Heart Disease Father      Social History     Tobacco Use    Smoking status: Never    Smokeless tobacco: Never   Substance Use Topics    Alcohol use: No      Prior to Admission medications    Medication Sig Start Date End Date Taking? Authorizing Provider   brinzolamide (Azopt) 1 % ophthalmic suspension Administer 1 Drop to left eye two (2) times a day. Yes Provider, Historical   biotin 10,000 mcg cap Take 1 Cap by mouth daily. Yes Provider, Historical   calcium citrate-vitamin D3 (CITRACAL WITH VITAMIN D MAXIMUM) tablet Take 1 Tab by mouth two (2) times a day. Yes Provider, Historical   mirabegron ER (MYRBETRIQ) 50 mg ER tablet Take 50 mg by mouth daily. Yes Provider, Historical   lidocaine (LIDODERM) 5 % 1 Patch by TransDERmal route as needed. Apply patch to the affected area for 12 hours a day and remove for 12 hours a day. Yes Provider, Historical   acetaminophen (TYLENOL) 325 mg tablet Take 325 mg by mouth every four (4) hours as needed for Pain. Yes Provider, Historical   tolterodine (DETROL) 2 mg tablet  3/11/19  Yes Provider, Historical   ELIQUIS 2.5 mg tablet take 1 tablet by mouth twice a day 6/12/17  Yes Provider, Historical   escitalopram oxalate (LEXAPRO) 5 mg tablet Take 5 mg by mouth daily. 6/13/17  Yes Provider, Historical   DILT- mg capsule Take 180 mg by mouth daily. 6/12/17  Yes Provider, Historical   cloNIDine HCl (CATAPRES) 0.1 mg tablet Take 0.1 mg by mouth daily. 5/23/17  Yes Provider, Historical   TRICOR 145 mg tablet Take 145 mg by mouth daily. 6/14/17  Yes Provider, Historical   fluticasone (FLONASE) 50 mcg/actuation nasal spray instill 1 spray INTO EACH NOSTRIL ONCE DAILY PRN 3/22/17  Yes Provider, Historical   furosemide (LASIX) 20 mg tablet Take 20 mg by mouth daily. 5/30/17  Yes Provider, Historical   lisinopril (PRINIVIL, ZESTRIL) 20 mg tablet Take 20 mg by mouth daily.  5/12/17  Yes Provider, Historical   glimepiride (AMARYL) 1 mg tablet Take 1 mg by mouth every morning. 5/12/17  Yes Provider, Historical   diphenhydrAMINE (BENADRYL) 25 mg capsule Take 25 mg by mouth every six (6) hours as needed. Yes Provider, Historical   Omega-3-DHA-EPA-Fish Oil 1,000 mg (120 mg-180 mg) cap Take 1,000 mg by mouth two (2) times a day. Yes Provider, Historical   multivitamin (ONE A DAY) tablet Take 1 Tab by mouth daily. Yes Provider, Historical   cholecalciferol (VITAMIN D3) (1000 Units /25 mcg) tablet Take 1,000 Units by mouth daily. Yes Provider, Historical        Allergies   Allergen Reactions    Penicillins Swelling    Tramadol Nausea and Vomiting         Objective:     Visit Vitals  BP (!) 140/80 (BP 1 Location: Right upper arm, BP Patient Position: Sitting, BP Cuff Size: Adult)   Pulse 63   Resp 17   Ht 5' 2\" (1.575 m)   Wt 140 lb (63.5 kg)   SpO2 98%   BMI 25.61 kg/m²        Physical Exam  Vitals reviewed. Constitutional:       General: She is awake. She is not in acute distress. Appearance: Normal appearance. She is well-groomed and normal weight. HENT:      Head: Normocephalic and atraumatic. Jaw: There is normal jaw occlusion. No trismus, tenderness or malocclusion. Salivary Glands: Right salivary gland is not diffusely enlarged or tender. Left salivary gland is not diffusely enlarged or tender. Comments: Small crusted lesion left cheek midportion     Right Ear: Tympanic membrane, ear canal and external ear normal. Decreased hearing noted. Left Ear: Tympanic membrane, ear canal and external ear normal. Decreased hearing noted. Nose: No nasal deformity, septal deviation or mucosal edema. Right Nostril: No epistaxis. Left Nostril: No epistaxis. Right Turbinates: Not enlarged, swollen or pale. Left Turbinates: Not enlarged, swollen or pale. Right Sinus: No maxillary sinus tenderness or frontal sinus tenderness. Left Sinus: No maxillary sinus tenderness or frontal sinus tenderness.       Mouth/Throat:      Lips: Pink. No lesions. Mouth: Mucous membranes are moist. No oral lesions. Dentition: Normal dentition. No dental caries. Tongue: No lesions. Palate: No mass and lesions. Pharynx: Oropharynx is clear. Uvula midline. No oropharyngeal exudate or posterior oropharyngeal erythema. Tonsils: No tonsillar exudate. 0 on the right. 0 on the left. Eyes:      General: Vision grossly intact. Extraocular Movements: Extraocular movements intact. Right eye: No nystagmus. Left eye: No nystagmus. Conjunctiva/sclera: Conjunctivae normal.      Pupils: Pupils are equal, round, and reactive to light. Neck:      Thyroid: No thyroid mass, thyromegaly or thyroid tenderness. Trachea: Trachea and phonation normal. No tracheal tenderness or tracheal deviation. Cardiovascular:      Rate and Rhythm: Normal rate and regular rhythm. Pulmonary:      Effort: Pulmonary effort is normal. No respiratory distress. Breath sounds: No stridor. Musculoskeletal:         General: No swelling or tenderness. Normal range of motion. Cervical back: No edema or erythema. Lymphadenopathy:      Cervical: No cervical adenopathy. Skin:     General: Skin is warm and dry. Findings: No lesion or rash. Neurological:      General: No focal deficit present. Mental Status: She is alert and oriented to person, place, and time. Mental status is at baseline. Cranial Nerves: Cranial nerves are intact. Coordination: Romberg sign negative. Gait: Gait is intact. Psychiatric:         Mood and Affect: Mood normal.         Behavior: Behavior normal. Behavior is cooperative. Assessment/Plan:     Encounter Diagnoses   Name Primary? Sensorineural hearing loss (SNHL) of both ears Yes    Epistaxis     Basal cell carcinoma (BCC) of skin of other part of face      Dermatology notes/reports reviewed with patient.   Discussed office excision in slow Mohs technique with frozen section. Discussed reconstruction including primary closure, rotational flap, skin grafting, and secondary intention. Questions answered. She is on Eliquis we will check with her PCP and cardiologist regarding holding this. History of nosebleeds but none in the last couple of years. She was told she may have hearing loss and she is interested in getting hearing testing but she may arrange this after her skin surgery. No orders of the defined types were placed in this encounter. Thank you for referring this patient,    Enrique Betancourt MD, [address] [facility]    [address]  [facility]    [address] [address]  [place], [place] [address] [facility]  [address]

## 2022-10-03 ENCOUNTER — TELEPHONE (OUTPATIENT)
Dept: ENT CLINIC | Age: 87
End: 2022-10-03

## 2022-10-03 NOTE — TELEPHONE ENCOUNTER
Addended by: HANNAH CAMILO on: 6/2/2021 09:29 PM     Modules accepted: Orders     PC from patient-states she has a procedure scheduled for tomorrow Tuesday Oct. 4th. Stopped her blood thinner yesterday. Has a nose bleed this a.m. but it is stopping now. She is worried about her procedure for tomorrow. .Please give her a call [phone].  Thanks, [employer]

## 2022-10-04 ENCOUNTER — OFFICE VISIT (OUTPATIENT)
Dept: ENT CLINIC | Age: 87
End: 2022-10-04
Payer: MEDICARE

## 2022-10-04 ENCOUNTER — HOSPITAL ENCOUNTER (OUTPATIENT)
Dept: LAB | Age: 87
Discharge: HOME | End: 2022-10-04
Payer: MEDICARE

## 2022-10-04 VITALS
HEIGHT: 62 IN | SYSTOLIC BLOOD PRESSURE: 154 MMHG | WEIGHT: 140 LBS | BODY MASS INDEX: 25.76 KG/M2 | OXYGEN SATURATION: 99 % | DIASTOLIC BLOOD PRESSURE: 82 MMHG | HEART RATE: 69 BPM | RESPIRATION RATE: 18 BRPM

## 2022-10-04 DIAGNOSIS — C44.319 BASAL CELL CARCINOMA (BCC) OF SKIN OF OTHER PART OF FACE: Primary | ICD-10-CM

## 2022-10-04 PROCEDURE — 88331 PATH CONSLTJ SURG 1 BLK 1SPC: CPT

## 2022-10-04 PROCEDURE — 11643 EXC F/E/E/N/L MAL+MRG 2.1-3: CPT | Performed by: OTOLARYNGOLOGY

## 2022-10-04 PROCEDURE — 88305 TISSUE EXAM BY PATHOLOGIST: CPT

## 2022-10-04 PROCEDURE — 12051 INTMD RPR FACE/MM 2.5 CM/<: CPT | Performed by: OTOLARYNGOLOGY

## 2022-10-04 NOTE — PROGRESS NOTES
Excision Malignant Neoplasm of Skin with Intermediate Closure    Informed consent was obtained. The area surrounding the left cheek skin lesion was cleansed with alcohol then injected with  3mL of 1% lidocaine with 1:100,000 parts epinephrine. We then prepped the area with betadine and draped in sterile fashion. A 15 blade was used to incise the skin surrounding the lesion with gross margins, down to the subcutaneous level. Scalpel and curved scissors were used to complete the excision. The specimen is suture marked for pathologic orientation and sent for frozen section. Hemostasis was achieved with bipolar cautery and a temporary pressure dressing was applied. Frozen section revealed concern for positive peripheral and deep margins. I therefore excised an additional 1 mm peripheral margin around the entire defect and incorporated the deep layer as well. A new suture is placed at the 12:00 margin of this reexcision. We opted to close at this time and will reexcised more in the future if necessary. The defect is 2.5 cm. We closed the defect primarily with a layered closure. The surrounding skin was undermined with scissors, then the wound closed in layers with interrupted 4-0 vicryl deep suture, and a running 5-0 fast absorbing plain gut for the skin. The wound was cleaned and Steri-strips and sterile pressure dressing was applied.

## 2022-10-07 NOTE — PROGRESS NOTES
Discussed result with Dr Carla Jeronimo - she is ok to closely monitor the focally positive margin.   She will fu with me as scheduled and with dermatology in January

## 2023-01-03 ENCOUNTER — OFFICE VISIT (OUTPATIENT)
Dept: ENT CLINIC | Age: 88
End: 2023-01-03
Payer: MEDICARE

## 2023-01-03 VITALS
SYSTOLIC BLOOD PRESSURE: 142 MMHG | BODY MASS INDEX: 25.76 KG/M2 | WEIGHT: 140 LBS | RESPIRATION RATE: 18 BRPM | OXYGEN SATURATION: 97 % | DIASTOLIC BLOOD PRESSURE: 82 MMHG | HEART RATE: 120 BPM | HEIGHT: 62 IN

## 2023-01-03 DIAGNOSIS — H91.93 BILATERAL HEARING LOSS, UNSPECIFIED HEARING LOSS TYPE: ICD-10-CM

## 2023-01-03 DIAGNOSIS — R04.0 EPISTAXIS: Primary | ICD-10-CM

## 2023-01-03 PROCEDURE — 1123F ACP DISCUSS/DSCN MKR DOCD: CPT | Performed by: OTOLARYNGOLOGY

## 2023-01-03 PROCEDURE — G8536 NO DOC ELDER MAL SCRN: HCPCS | Performed by: OTOLARYNGOLOGY

## 2023-01-03 PROCEDURE — G8510 SCR DEP NEG, NO PLAN REQD: HCPCS | Performed by: OTOLARYNGOLOGY

## 2023-01-03 PROCEDURE — 99213 OFFICE O/P EST LOW 20 MIN: CPT | Performed by: OTOLARYNGOLOGY

## 2023-01-03 PROCEDURE — G8417 CALC BMI ABV UP PARAM F/U: HCPCS | Performed by: OTOLARYNGOLOGY

## 2023-01-03 PROCEDURE — 1090F PRES/ABSN URINE INCON ASSESS: CPT | Performed by: OTOLARYNGOLOGY

## 2023-01-03 PROCEDURE — G8427 DOCREV CUR MEDS BY ELIG CLIN: HCPCS | Performed by: OTOLARYNGOLOGY

## 2023-01-03 PROCEDURE — 1101F PT FALLS ASSESS-DOCD LE1/YR: CPT | Performed by: OTOLARYNGOLOGY

## 2023-01-03 NOTE — PROGRESS NOTES
Subjective: Ayana Ibarra   80 y.o.   11/30/1934     Follow-up visit    Location -left cheek    Quality -[facility]    Severity -mild to moderate    Duration -few months     Timing -ongoing    Context -sent by dermatology for left cheek basal cell carcinoma, surgical consultation. History of melanoma of the left arm    Modifying Features -none    Associated symptoms/signs -hearing loss, history of epistaxis    1/3/2023  Patient last seen 3 months ago for excision of the left cheek skin cancer, presents today for different problem. States had right-sided epistaxis 5 days ago, kept pouring, went to [facility] emergency and had nasal packing done. She also stopped her 2.5 mg Eliquis. Has had nosebleeds on and off previously but nothing this heavy. Review of Systems  Review of Systems   Constitutional:  Negative for chills and fever. HENT:  Positive for hearing loss and nosebleeds. Negative for ear pain and tinnitus. Eyes:  Negative for blurred vision and double vision. Respiratory:  Negative for cough, sputum production and shortness of breath. Cardiovascular:  Negative for chest pain and palpitations. Gastrointestinal:  Negative for heartburn, nausea and vomiting. Musculoskeletal:  Positive for joint pain and neck pain. Skin: Negative. Neurological:  Positive for weakness. Negative for dizziness, speech change and headaches. Endo/Heme/Allergies:  Negative for environmental allergies. Does not bruise/bleed easily. Psychiatric/Behavioral:  Negative for memory loss. The patient does not have insomnia.         Past Medical History:   Diagnosis Date    Atrial fibrillation ([address]     Cancer ([address]     Heart disease     Hyperlipidemia     Hypertension     Incontinence     Melan[address] [address]     Type 2 diabetes mellitus ([address]      Past Surgical History:   Procedure Laterality Date    HX ATRIAL SEPTAL DEFECT REPAIR  1992    HX CATARACT REMOVAL Bilateral 2016    HX CHOLECYSTECTOMY  1993    HX COLONOSCOPY 08/2017    HX CYST REMOVAL  1969    on spine    HX HYSTERECTOMY  1988    HX KNEE ARTHROSCOPY  2011    HX LIPOMA RESECTION  2014    HX OTHER SURGICAL  2013    Melanoma excision    HX OTHER SURGICAL  1966    Diverticulum    HX TONSILLECTOMY  1943      Family History   Problem Relation Age of Onset    Cancer Mother         breast    Heart Disease Father      Social History     Tobacco Use    Smoking status: Never    Smokeless tobacco: Never   Substance Use Topics    Alcohol use: No      Prior to Admission medications    Medication Sig Start Date End Date Taking? Authorizing Provider   brinzolamide (Azopt) 1 % ophthalmic suspension Administer 1 Drop to left eye two (2) times a day. Provider, Historical   biotin 10,000 mcg cap Take 1 Cap by mouth daily. Provider, Historical   calcium citrate-vitamin D3 (CITRACAL WITH VITAMIN D MAXIMUM) tablet Take 1 Tab by mouth two (2) times a day. Provider, Historical   mirabegron ER (MYRBETRIQ) 50 mg ER tablet Take 50 mg by mouth daily. Provider, Historical   lidocaine (LIDODERM) 5 % 1 Patch by TransDERmal route as needed. Apply patch to the affected area for 12 hours a day and remove for 12 hours a day. Provider, Historical   acetaminophen (TYLENOL) 325 mg tablet Take 325 mg by mouth every four (4) hours as needed for Pain. Provider, Historical   tolterodine (DETROL) 2 mg tablet  3/11/19   Provider, Historical   ELIQUIS 2.5 mg tablet take 1 tablet by mouth twice a day 6/12/17   Provider, Historical   escitalopram oxalate (LEXAPRO) 5 mg tablet Take 5 mg by mouth daily. 6/13/17   Provider, Historical   DILT- mg capsule Take 180 mg by mouth daily. 6/12/17   Provider, Historical   cloNIDine HCl (CATAPRES) 0.1 mg tablet Take 0.1 mg by mouth daily. 5/23/17   Provider, Historical   TRICOR 145 mg tablet Take 145 mg by mouth daily.  6/14/17   Provider, Historical   fluticasone (FLONASE) 50 mcg/actuation nasal spray instill 1 spray INTO EACH NOSTRIL ONCE DAILY PRN 3/22/17   Provider, Historical   furosemide (LASIX) 20 mg tablet Take 20 mg by mouth daily. 5/30/17   Provider, Historical   lisinopril (PRINIVIL, ZESTRIL) 20 mg tablet Take 20 mg by mouth daily. 5/12/17   Provider, Historical   glimepiride (AMARYL) 1 mg tablet Take 1 mg by mouth every morning. 5/12/17   Provider, Historical   diphenhydrAMINE (BENADRYL) 25 mg capsule Take 25 mg by mouth every six (6) hours as needed. Provider, Historical   Omega-3-DHA-EPA-Fish Oil 1,000 mg (120 mg-180 mg) cap Take 1,000 mg by mouth two (2) times a day. Provider, Historical   multivitamin (ONE A DAY) tablet Take 1 Tab by mouth daily. Provider, Historical   cholecalciferol (VITAMIN D3) (1000 Units /25 mcg) tablet Take 1,000 Units by mouth daily. Provider, Historical        Allergies   Allergen Reactions    Penicillins Swelling    Tramadol Nausea and Vomiting         Objective:     Visit Vitals  BP (!) 142/82 (BP 1 Location: Left upper arm, BP Patient Position: Sitting, BP Cuff Size: Adult)   Pulse (!) 120   Resp 18   Ht 5' 2\" (1.575 m)   Wt 140 lb (63.5 kg)   SpO2 97%   BMI 25.61 kg/m²        Physical Exam  Vitals reviewed. Constitutional:       General: She is awake. She is not in acute distress. Appearance: Normal appearance. She is well-groomed and normal weight. HENT:      Head: Normocephalic and atraumatic. Jaw: There is normal jaw occlusion. No trismus, tenderness or malocclusion. Salivary Glands: Right salivary gland is not diffusely enlarged or tender. Left salivary gland is not diffusely enlarged or tender. Comments: Well-healed left cheek incision line     Right Ear: Tympanic membrane, ear canal and external ear normal. Decreased hearing noted. Left Ear: Tympanic membrane, ear canal and external ear normal. Decreased hearing noted. Nose: No nasal deformity, septal deviation or mucosal edema. Right Nostril: Epistaxis present. Left Nostril: No epistaxis.       Right Turbinates: Not enlarged, swollen or pale. Left Turbinates: Not enlarged, swollen or pale. Right Sinus: No maxillary sinus tenderness or frontal sinus tenderness. Left Sinus: No maxillary sinus tenderness or frontal sinus tenderness. Comments: Rapid Rhino packing deflated and removed from right nasal cavity. No bleeding noted, some ecchymosis to inferior turbinate     Mouth/Throat:      Lips: Pink. No lesions. Mouth: Mucous membranes are moist. No oral lesions. Dentition: Normal dentition. No dental caries. Tongue: No lesions. Palate: No mass and lesions. Pharynx: Oropharynx is clear. Uvula midline. No oropharyngeal exudate or posterior oropharyngeal erythema. Tonsils: No tonsillar exudate. 0 on the right. 0 on the left. Eyes:      General: Vision grossly intact. Extraocular Movements: Extraocular movements intact. Right eye: No nystagmus. Left eye: No nystagmus. Conjunctiva/sclera: Conjunctivae normal.      Pupils: Pupils are equal, round, and reactive to light. Neck:      Thyroid: No thyroid mass, thyromegaly or thyroid tenderness. Trachea: Trachea and phonation normal. No tracheal tenderness or tracheal deviation. Cardiovascular:      Rate and Rhythm: Normal rate and regular rhythm. Pulmonary:      Effort: Pulmonary effort is normal. No respiratory distress. Breath sounds: No stridor. Musculoskeletal:         General: No swelling or tenderness. Normal range of motion. Cervical back: No edema or erythema. Lymphadenopathy:      Cervical: No cervical adenopathy. Skin:     General: Skin is warm and dry. Findings: No lesion or rash. Neurological:      General: No focal deficit present. Mental Status: She is alert and oriented to person, place, and time. Mental status is at baseline. Coordination: Romberg sign negative. Gait: Gait is intact.    Psychiatric:         Mood and Affect: Mood normal. Behavior: Behavior normal. Behavior is cooperative. Assessment/Plan:     Encounter Diagnoses   Name Primary? Epistaxis Yes    Bilateral hearing loss, unspecified hearing loss type      Right nasal packing removed. No further bleeding at this time. Epistaxis handout is given. Encourage the use of saline and Ayr gel. Okay to continue holding Eliquis for a few more days then she can resume. She was told she may have hearing loss and she is interested in getting hearing testing, we can do this at next visit in 1 month    No orders of the defined types were placed in this encounter. Follow-up and Dispositions    Return in about 4 weeks (around 1/31/2023). Thank you for referring this patient,    Enrique Qureshi MD, [address] [facility]    [address]  [place] L[place]    [id number] [address]  [place], [place] [address] [place] [id number]

## 2023-05-04 ENCOUNTER — OFFICE VISIT (OUTPATIENT)
Dept: ENT CLINIC | Age: 88
End: 2023-05-04

## 2023-05-04 DIAGNOSIS — H90.3 SENSORINEURAL HEARING LOSS (SNHL) OF BOTH EARS: Primary | ICD-10-CM

## 2023-05-18 RX ORDER — ESCITALOPRAM OXALATE 5 MG/1
5 TABLET ORAL DAILY
COMMUNITY
Start: 2017-06-13

## 2023-05-18 RX ORDER — CLONIDINE HYDROCHLORIDE 0.1 MG/1
0.1 TABLET ORAL DAILY
COMMUNITY
Start: 2017-05-23

## 2023-05-18 RX ORDER — LIDOCAINE 50 MG/G
1 PATCH TOPICAL PRN
COMMUNITY

## 2023-05-18 RX ORDER — FENOFIBRATE 145 MG/1
145 TABLET, COATED ORAL DAILY
COMMUNITY
Start: 2017-06-14

## 2023-05-18 RX ORDER — GLIMEPIRIDE 1 MG/1
1 TABLET ORAL
COMMUNITY
Start: 2017-05-12

## 2023-05-18 RX ORDER — DILTIAZEM HYDROCHLORIDE 180 MG/1
180 CAPSULE, EXTENDED RELEASE ORAL DAILY
COMMUNITY
Start: 2017-06-12

## 2023-05-18 RX ORDER — FUROSEMIDE 20 MG/1
20 TABLET ORAL DAILY
COMMUNITY
Start: 2017-05-30

## 2023-05-18 RX ORDER — DIPHENHYDRAMINE HCL 25 MG
25 CAPSULE ORAL EVERY 6 HOURS PRN
COMMUNITY

## 2023-05-18 RX ORDER — TOLTERODINE TARTRATE 2 MG/1
TABLET, EXTENDED RELEASE ORAL
COMMUNITY
Start: 2019-03-11

## 2023-05-18 RX ORDER — THIAMINE HCL 100 MG
1 TABLET ORAL 2 TIMES DAILY
COMMUNITY

## 2023-05-18 RX ORDER — LISINOPRIL 20 MG/1
20 TABLET ORAL DAILY
COMMUNITY
Start: 2017-05-12

## 2023-05-18 RX ORDER — ACETAMINOPHEN 325 MG/1
325 TABLET ORAL EVERY 4 HOURS PRN
COMMUNITY

## 2023-05-18 RX ORDER — FLUTICASONE PROPIONATE 50 MCG
SPRAY, SUSPENSION (ML) NASAL
COMMUNITY
Start: 2017-03-22

## 2023-05-18 RX ORDER — BIOTIN 10000 MCG
1 CAPSULE ORAL DAILY
COMMUNITY

## 2023-05-18 RX ORDER — BRINZOLAMIDE 10 MG/ML
1 SUSPENSION/ DROPS OPHTHALMIC 2 TIMES DAILY
COMMUNITY